# Patient Record
Sex: FEMALE | Race: WHITE | URBAN - METROPOLITAN AREA
[De-identification: names, ages, dates, MRNs, and addresses within clinical notes are randomized per-mention and may not be internally consistent; named-entity substitution may affect disease eponyms.]

---

## 2018-01-24 ENCOUNTER — APPOINTMENT (RX ONLY)
Dept: URBAN - METROPOLITAN AREA CLINIC 114 | Facility: CLINIC | Age: 63
Setting detail: DERMATOLOGY
End: 2018-01-24

## 2018-01-24 VITALS — WEIGHT: 190 LBS | HEIGHT: 70 IN

## 2018-01-24 DIAGNOSIS — L82.1 OTHER SEBORRHEIC KERATOSIS: ICD-10-CM

## 2018-01-24 DIAGNOSIS — D485 NEOPLASM OF UNCERTAIN BEHAVIOR OF SKIN: ICD-10-CM

## 2018-01-24 DIAGNOSIS — D18.0 HEMANGIOMA: ICD-10-CM

## 2018-01-24 DIAGNOSIS — L81.4 OTHER MELANIN HYPERPIGMENTATION: ICD-10-CM

## 2018-01-24 PROBLEM — D18.01 HEMANGIOMA OF SKIN AND SUBCUTANEOUS TISSUE: Status: ACTIVE | Noted: 2018-01-24

## 2018-01-24 PROBLEM — D48.5 NEOPLASM OF UNCERTAIN BEHAVIOR OF SKIN: Status: ACTIVE | Noted: 2018-01-24

## 2018-01-24 PROCEDURE — 99203 OFFICE O/P NEW LOW 30 MIN: CPT | Mod: 25

## 2018-01-24 PROCEDURE — 11100: CPT

## 2018-01-24 PROCEDURE — ? COUNSELING

## 2018-01-24 PROCEDURE — ? LIQUID NITROGEN

## 2018-01-24 PROCEDURE — ? BIOPSY BY SHAVE METHOD

## 2018-01-24 PROCEDURE — 11101: CPT

## 2018-01-24 ASSESSMENT — LOCATION ZONE DERM
LOCATION ZONE: ARM
LOCATION ZONE: LEG
LOCATION ZONE: FACE
LOCATION ZONE: TRUNK
LOCATION ZONE: NECK

## 2018-01-24 ASSESSMENT — LOCATION DETAILED DESCRIPTION DERM
LOCATION DETAILED: LEFT LATERAL SUPERIOR CHEST
LOCATION DETAILED: RIGHT ANTERIOR DISTAL UPPER ARM
LOCATION DETAILED: LEFT INFERIOR LATERAL MIDBACK
LOCATION DETAILED: EPIGASTRIC SKIN
LOCATION DETAILED: RIGHT MEDIAL MALAR CHEEK
LOCATION DETAILED: RIGHT ANTERIOR DISTAL THIGH
LOCATION DETAILED: LEFT ANTERIOR DISTAL UPPER ARM
LOCATION DETAILED: LEFT ANTERIOR DISTAL THIGH
LOCATION DETAILED: LEFT PROXIMAL RADIAL DORSAL FOREARM
LOCATION DETAILED: LEFT LATERAL TRAPEZIAL NECK
LOCATION DETAILED: LEFT CENTRAL MALAR CHEEK
LOCATION DETAILED: RIGHT MEDIAL SUPERIOR CHEST
LOCATION DETAILED: RIGHT PROXIMAL RADIAL DORSAL FOREARM
LOCATION DETAILED: LEFT INFERIOR CENTRAL MALAR CHEEK
LOCATION DETAILED: RIGHT SUPERIOR LATERAL LOWER BACK
LOCATION DETAILED: RIGHT RIB CAGE
LOCATION DETAILED: RIGHT LATERAL TRAPEZIAL NECK

## 2018-01-24 ASSESSMENT — LOCATION SIMPLE DESCRIPTION DERM
LOCATION SIMPLE: LEFT UPPER ARM
LOCATION SIMPLE: LEFT CHEEK
LOCATION SIMPLE: LEFT FOREARM
LOCATION SIMPLE: RIGHT LOWER BACK
LOCATION SIMPLE: RIGHT THIGH
LOCATION SIMPLE: RIGHT UPPER ARM
LOCATION SIMPLE: ABDOMEN
LOCATION SIMPLE: LEFT THIGH
LOCATION SIMPLE: RIGHT FOREARM
LOCATION SIMPLE: LEFT LOWER BACK
LOCATION SIMPLE: POSTERIOR NECK
LOCATION SIMPLE: RIGHT CHEEK
LOCATION SIMPLE: CHEST

## 2018-01-24 NOTE — PROCEDURE: LIQUID NITROGEN
Include Z78.9 (Other Specified Conditions Influencing Health Status) As An Associated Diagnosis?: No
Detail Level: Zone
Medical Necessity Clause: This procedure was medically necessary because the lesions that were treated were:
Post-Care Instructions: I reviewed with the patient in detail post-care instructions. Patient is to wear sunprotection, and avoid picking at any of the treated lesions. Pt may apply Vaseline to crusted or scabbing areas.
Medical Necessity Information: It is in your best interest to select a reason for this procedure from the list below. All of these items fulfill various CMS LCD requirements except the new and changing color options.
Consent: The patient's consent was obtained including but not limited to risks of crusting, scabbing, blistering, scarring, darker or lighter pigmentary change, recurrence, incomplete removal and infection.
Total Number Of Lesions Treated: 5

## 2018-01-24 NOTE — PROCEDURE: BIOPSY BY SHAVE METHOD
Additional Anesthesia Volume In Cc (Will Not Render If 0): 0
Bill 48887 For Specimen Handling/Conveyance To Laboratory?: no
Anesthesia Type: 1% lidocaine with epinephrine and a 1:10 solution of 8.4% sodium bicarbonate
Biopsy Method: Dermablade
Cryotherapy Text: The wound bed was treated with cryotherapy after the biopsy was performed.
Detail Level: Detailed
Wound Care: Polysporin ointment
Post-Care Instructions: I reviewed with the patient in detail post-care instructions: If your area was covered with a Band-Aid, remove with your next bath and gently clean daily with soap and water. Do not use hydrogen peroxide. Apply a thin layer of Aquaphor/Vaseline and cover with a Band-Aid. The goal is to keep the wound moist to prevent a scab. It is normal to have a little redness 1/8 inch around the area; however, if increased redness, pain, swelling or discolored drainage occurs, call the office. If bleeding occurs, hold steady pressure for 10-15 minutes to stop it. Our office normally receives your results in 7-10 business days and will call or email you when we receive them. If you do not hear from us in two weeks, please call us.
Biopsy Type: H and E
Electrodesiccation And Curettage Text: The wound bed was treated with electrodesiccation and curettage after the biopsy was performed.
Electrodesiccation Text: The wound bed was treated with electrodesiccation after the biopsy was performed.
Size Of Lesion In Cm: 0.4
Type Of Destruction Used: Curettage
Size Of Lesion In Cm: 0.6
Curettage Text: The wound bed was treated with curettage after the biopsy was performed.
Billing Type: Third-Party Bill
Notification Instructions: Patient will be notified of biopsy results. However, patient instructed to call the office if not contacted within 2 weeks.
Hemostasis: Drysol
Dressing: bandage
Anesthesia Volume In Cc: 0.5
Consent: Written consent was obtained and risks were reviewed including but not limited to scarring, infection, bleeding, scabbing, incomplete removal, nerve damage and allergy to anesthesia.
Silver Nitrate Text: The wound bed was treated with silver nitrate after the biopsy was performed.
Render Post-Care Instructions In Note?: yes

## 2018-02-21 ENCOUNTER — APPOINTMENT (RX ONLY)
Dept: URBAN - METROPOLITAN AREA CLINIC 114 | Facility: CLINIC | Age: 63
Setting detail: DERMATOLOGY
End: 2018-02-21

## 2018-02-21 PROBLEM — C44.519 BASAL CELL CARCINOMA OF SKIN OF OTHER PART OF TRUNK: Status: ACTIVE | Noted: 2018-02-21

## 2018-02-21 PROCEDURE — ? SUPERFICIAL RADIATION TREATMENT

## 2018-02-21 PROCEDURE — 77401: CPT

## 2018-02-21 PROCEDURE — 77332 RADIATION TREATMENT AID(S): CPT

## 2018-02-21 PROCEDURE — 77280 THER RAD SIMULAJ FIELD SMPL: CPT

## 2018-02-21 PROCEDURE — 77300 RADIATION THERAPY DOSE PLAN: CPT

## 2018-02-21 NOTE — PROCEDURE: SUPERFICIAL RADIATION TREATMENT
Long For Simulation Without Treatment Device Design (Simple Simulation): Yes
Port Dimensions-X Axis In Cm: 0.8
Render Text From Evaluation And Management Tab (Will Not Bill 05808): No
Energy (Include Units): 70
Additional Prescription Justification Text: If there is any interruption in treatment exceeding 5 days please see Decay and Dose Adjustment Calculation and complete treatment under Prescription 2, 3 or 4 as appropriate.
Total Number Of Fractions: 16
Ssd In Cm (Optional): 15
Fractions / Week Rx 3: 5
Dose / Tx In Cgy (Optional): 320.5
Body Location Override (Optional): right medical superior chest
Total Dose (Optional-Please Include Units) Rx 4: 5126.0 cGy
Depth (Optional-Please Include Units): 1.05 cm
Energy (Optional-Please Include Units): 70 kv
Time Dose Fractionation (Optional- Include Units If Applicable): 100 TDF
Field Size (Applicator): 2.0 cm
Treatment Time / Fractionation (Optional- Include Units): .49 min
Patient Positioning: Supine
Treatment Margins In Cm: 0
Treatment Time In Min (Optional): .49
Dimensions-X Axis In Cm: 0.7
Intro Statement (Will Not Render If Left Blank): The patient is undergoing superficial radiation therapy for skin cancer and presents for weekly evaluation and management.  Per protocol and as documented on the flow sheet, the patient was questioned as to subjective redness, pruritus, pain, drainage, fatigue, or any other symptoms.  Objectively, the radiation area was evaluated with regards to erythema, atrophy, scale, crusting, erosion, ulceration, edema, purpura, tenderness, warmth, drainage, and any other findings.  The plan was extensively reviewed including the dose, and dosing schedule.  The simulation and clinical setup was also reviewed as was the external and any internal shields and based on this review the appropriateness and sufficiency of treatment was determined.
Number Of Days Off Treatment: 1
Assessment: Appropriate reaction
Simple Simulation Afterword Text Will Be Included With Simple Simulations (Indications............): The patient had a complete consultation regarding all applicable modalities for the treatment of their skin cancer and based on a variety of factors including the type of tumor, size, and location, the relevant medical history as well as local tissue factors, the functional status of the individual, the ability to perform necessary postoperative wound instructions and the need for simultaneous treatments as well as overall wound healing status, it was determined that the patient would begin radiation therapy treatment for skin cancer.  A full simulation and treatment device design was performed including the determination and formulation of appropriate simple and complex devices including lead shield of 0.762 mm thickness to form molded customized shielding to specifically correlate with the lesion size including treatment margin.  The custom lead shield is adequate to accommodate the appropriate applicator and provide adequate shielding around the treatment site.  The specific field applicator, shields, and devices both simple and complex as well as the specific patient setup is outlined below.  The patient was given a full consent for superficial radiation to both verbally and in writing and the full determination of patient's eligibility for treatment and selection is outlined on the patient eligibility and treatment selection form.  The specific superficial radiotherapy prescription was determined and was documented on the superficial radiotherapy prescription form.  A treatment calculation was also performed and documented on the treatment calculation form.  Based on the prescription, the patient was scheduled for a series of fractional treatments.
Daily Fractionated Dose (Optional- Include Units): 320.5 cgy
Fractions / Week: 3
Total Dose (Optional-Please Include Units): 5128 cgy
Simple Simulation Preamble Text Will Be Included With Simple Simulations (.......... Indications): Simple simulation was performed today for the following reasons:
Day Of The Week Treatment Administered: Wednesday
Detail Level: Detailed
Functional Status: 0 (fully active)
Custom Shielding Preamble Text Will Not Be Included With Simple Simulations (.......... X X Y Cm): A lead shield of 0.762 mm thickness is utilized to form a molded, custom shield with a
Port Dimensions-Y Axis In Cm: 0.5
Custom Shielding Afterword Text Will Not Be Included With Simple Simulations (X X Y Cm............): port to correlate with the lesion size, including treatment margin. The custom lead shield is adequate to accommodate the appropriate applicator and provide adequate shielding around the treatment site. Additional shielding (as noted below) is used to protect sensitive, normal tissues.
Body Location Override (Optional): left lateral superior chest
Dimensions-Y Axis In Cm: 0.1
Treatment Device Design After Initial Simulation Justification (Will Render If Bill For Treatment Devices = Yes): The patient is status post radiation simulation and is evaluated as to the use of additional devices for shielding and placement for radiation therapy.
Field Number: 2

## 2018-02-23 ENCOUNTER — APPOINTMENT (RX ONLY)
Dept: URBAN - METROPOLITAN AREA CLINIC 114 | Facility: CLINIC | Age: 63
Setting detail: DERMATOLOGY
End: 2018-02-23

## 2018-02-23 PROBLEM — C44.519 BASAL CELL CARCINOMA OF SKIN OF OTHER PART OF TRUNK: Status: ACTIVE | Noted: 2018-02-23

## 2018-02-23 PROCEDURE — ? SUPERFICIAL RADIATION TREATMENT

## 2018-02-23 PROCEDURE — 77401: CPT

## 2018-02-23 NOTE — PROCEDURE: SUPERFICIAL RADIATION TREATMENT
Depth (Optional-Please Include Units): 1.05 cm
Port Dimensions-X Axis In Cm: 0.8
Body Location Override (Optional): right medical superior chest
Total Number Of Fractions Rx 4: 15
Bill For Radiation Treatment: No
Treatment Device Design After Initial Simulation Justification (Will Render If Bill For Treatment Devices = Yes): The patient is status post radiation simulation and is evaluated as to the use of additional devices for shielding and placement for radiation therapy.
Treatment Time / Fractionation (Optional- Include Units): .49 min
Simple Simulation Preamble Text Will Be Included With Simple Simulations (.......... Indications): Simple simulation was performed today for the following reasons:
Fractions / Week Rx 4: 5
Port Dimensions-Y Axis In Cm: 0.7
Energy (Optional-Please Include Units): 70 kv
Functional Status: 0 (fully active)
Energy (Include Units): 70
Patient Positioning: Supine
Dose Per Fractionation In Cgy (Optional): 320.5
Field Size (Applicator): 2.0 cm
Daily Fractionated Dose (Optional- Include Units): 320.5 cgy
Time Dose Fractionation (Optional- Include Units If Applicable): 100 TDF
Fractions / Week: 3
Detail Level: Detailed
Number Of Treatment Days: 1
Intro Statement (Will Not Render If Left Blank): The patient is undergoing superficial radiation therapy for skin cancer and presents for weekly evaluation and management.  Per protocol and as documented on the flow sheet, the patient was questioned as to subjective redness, pruritus, pain, drainage, fatigue, or any other symptoms.  Objectively, the radiation area was evaluated with regards to erythema, atrophy, scale, crusting, erosion, ulceration, edema, purpura, tenderness, warmth, drainage, and any other findings.  The plan was extensively reviewed including the dose, and dosing schedule.  The simulation and clinical setup was also reviewed as was the external and any internal shields and based on this review the appropriateness and sufficiency of treatment was determined.
Initial Radiation Treatment Planning (Will Render If Bill Simulation = Yes): The patient had a complete consultation regarding all applicable modalities for the treatment of their skin cancer and based on a variety of factors including the type of tumor, size, and location, the relevant medical history as well as local tissue factors, the functional status of the individual, the ability to perform necessary postoperative wound instructions and the need for simultaneous treatments as well as overall wound healing status, it was determined that the patient would begin radiation therapy treatment for skin cancer.  A full simulation and treatment device design was performed including the determination and formulation of appropriate simple and complex devices including lead shield of 0.762 mm thickness to form molded customized shielding to specifically correlate with the lesion size including treatment margin.  The custom lead shield is adequate to accommodate the appropriate applicator and provide adequate shielding around the treatment site.  The specific field applicator, shields, and devices both simple and complex as well as the specific patient setup is outlined below.  The patient was given a full consent for superficial radiation to both verbally and in writing and the full determination of patient's eligibility for treatment and selection is outlined on the patient eligibility and treatment selection form.  The specific superficial radiotherapy prescription was determined and was documented on the superficial radiotherapy prescription form.  A treatment calculation was also performed and documented on the treatment calculation form.  Based on the prescription, the patient was scheduled for a series of fractional treatments.
Treatment Time In Min (Optional): .49
Field Number: 2
Total Number Of Fractions: 16
Assessment: Appropriate reaction
Total Dose (Optional-Please Include Units): 5128 cgy
Day Of The Week Treatment Administered: Friday
Cumulative Dose In Cgy (Optional): 641
Additional Prescription Justification Text: If there is any interruption in treatment exceeding 5 days please see Decay and Dose Adjustment Calculation and complete treatment under Prescription 2, 3 or 4 as appropriate.
Custom Shielding Preamble Text Will Not Be Included With Simple Simulations (.......... X X Y Cm): A lead shield of 0.762 mm thickness is utilized to form a molded, custom shield with a
Custom Shielding Afterword Text Will Not Be Included With Simple Simulations (X X Y Cm............): port to correlate with the lesion size, including treatment margin. The custom lead shield is adequate to accommodate the appropriate applicator and provide adequate shielding around the treatment site. Additional shielding (as noted below) is used to protect sensitive, normal tissues.
Total Dose (Optional-Please Include Units) Rx 4: 5126.0 cGy
Dimensions-Y Axis In Cm: 0.1
Body Location Override (Optional): left lateral superior chest
Treatment Margins In Cm: 0
Port Dimensions-Y Axis In Cm: 0.5
Bill For Radiation Treatment: Yes

## 2018-02-26 ENCOUNTER — APPOINTMENT (RX ONLY)
Dept: URBAN - METROPOLITAN AREA CLINIC 114 | Facility: CLINIC | Age: 63
Setting detail: DERMATOLOGY
End: 2018-02-26

## 2018-02-26 PROBLEM — C44.519 BASAL CELL CARCINOMA OF SKIN OF OTHER PART OF TRUNK: Status: ACTIVE | Noted: 2018-02-26

## 2018-02-26 PROCEDURE — 77401: CPT

## 2018-02-26 PROCEDURE — ? SUPERFICIAL RADIATION TREATMENT

## 2018-02-26 NOTE — PROCEDURE: SUPERFICIAL RADIATION TREATMENT
Bill For Dosimetry/Render Treatment Time Calculation In Note: No
Total Dose (Optional-Please Include Units): 5128 cgy
Body Location Override (Optional): right medical superior chest
Cumulative Dose In Cgy (Optional): 964.8
Port Dimensions-X Axis In Cm: 0.8
Day Of The Week Treatment Administered: Monday
Total Number Of Fractions: 16
Fractionation Number (Evaluation): 5
Number Of Treatment Days: 1
Dose / Tx In Cgy (Optional): 320.5
Initial Radiation Treatment Planning (Will Render If Bill Simulation = Yes): The patient had a complete consultation regarding all applicable modalities for the treatment of their skin cancer and based on a variety of factors including the type of tumor, size, and location, the relevant medical history as well as local tissue factors, the functional status of the individual, the ability to perform necessary postoperative wound instructions and the need for simultaneous treatments as well as overall wound healing status, it was determined that the patient would begin radiation therapy treatment for skin cancer.  A full simulation and treatment device design was performed including the determination and formulation of appropriate simple and complex devices including lead shield of 0.762 mm thickness to form molded customized shielding to specifically correlate with the lesion size including treatment margin.  The custom lead shield is adequate to accommodate the appropriate applicator and provide adequate shielding around the treatment site.  The specific field applicator, shields, and devices both simple and complex as well as the specific patient setup is outlined below.  The patient was given a full consent for superficial radiation to both verbally and in writing and the full determination of patient's eligibility for treatment and selection is outlined on the patient eligibility and treatment selection form.  The specific superficial radiotherapy prescription was determined and was documented on the superficial radiotherapy prescription form.  A treatment calculation was also performed and documented on the treatment calculation form.  Based on the prescription, the patient was scheduled for a series of fractional treatments.
Port Dimensions-Y Axis In Cm: 0.7
Energy Output In Cgy/Min (Optional): 70
Computed Treatment Time In Min (Will Render The Same As Calculated Treatment Time If Left Blank): .49
Functional Status: 0 (fully active)
Assessment: Appropriate reaction
Dimensions-Y Axis In Cm: 0.1
Patient Positioning: Supine
Field Size (Applicator): 2.0 cm
Intro Statement (Will Not Render If Left Blank): The patient is undergoing superficial radiation therapy for skin cancer and presents for weekly evaluation and management.  Per protocol and as documented on the flow sheet, the patient was questioned as to subjective redness, pruritus, pain, drainage, fatigue, or any other symptoms.  Objectively, the radiation area was evaluated with regards to erythema, atrophy, scale, crusting, erosion, ulceration, edema, purpura, tenderness, warmth, drainage, and any other findings.  The plan was extensively reviewed including the dose, and dosing schedule.  The simulation and clinical setup was also reviewed as was the external and any internal shields and based on this review the appropriateness and sufficiency of treatment was determined.
Custom Shielding Preamble Text Will Not Be Included With Simple Simulations (.......... X X Y Cm): A lead shield of 0.762 mm thickness is utilized to form a molded, custom shield with a
Body Location Override (Optional): left lateral superior chest
Total Number Of Fractions Rx 4: 15
Fractionation Number: 3
Energy (Optional-Please Include Units): 70 kv
Total Dose (Optional-Please Include Units) Rx 4: 5126.0 cGy
Custom Shielding Afterword Text Will Not Be Included With Simple Simulations (X X Y Cm............): port to correlate with the lesion size, including treatment margin. The custom lead shield is adequate to accommodate the appropriate applicator and provide adequate shielding around the treatment site. Additional shielding (as noted below) is used to protect sensitive, normal tissues.
Treatment Margins In Cm: 0
Prescription Used: 2
Additional Prescription Justification Text: If there is any interruption in treatment exceeding 5 days please see Decay and Dose Adjustment Calculation and complete treatment under Prescription 2, 3 or 4 as appropriate.
Simple Simulation Preamble Text Will Be Included With Simple Simulations (.......... Indications): Simple simulation was performed today for the following reasons:
Depth (Optional-Please Include Units): 1.05 cm
Detail Level: Detailed
Time Dose Fractionation (Optional- Include Units If Applicable): 100 TDF
Cumulative Dose In Cgy (Optional): 961.5
Treatment Device Design After Initial Simulation Justification (Will Render If Bill For Treatment Devices = Yes): The patient is status post radiation simulation and is evaluated as to the use of additional devices for shielding and placement for radiation therapy.
Daily Fractionated Dose (Optional- Include Units): 320.5 cgy
Treatment Time / Fractionation (Optional- Include Units): .49 min
Port Dimensions-Y Axis In Cm: 0.5
Bill For Radiation Treatment: Yes

## 2018-02-28 ENCOUNTER — APPOINTMENT (RX ONLY)
Dept: URBAN - METROPOLITAN AREA CLINIC 114 | Facility: CLINIC | Age: 63
Setting detail: DERMATOLOGY
End: 2018-02-28

## 2018-02-28 DIAGNOSIS — L58.9 RADIODERMATITIS, UNSPECIFIED: ICD-10-CM

## 2018-02-28 PROBLEM — C44.519 BASAL CELL CARCINOMA OF SKIN OF OTHER PART OF TRUNK: Status: ACTIVE | Noted: 2018-02-28

## 2018-02-28 PROCEDURE — 99212 OFFICE O/P EST SF 10 MIN: CPT | Mod: 25

## 2018-02-28 PROCEDURE — 77401: CPT

## 2018-02-28 PROCEDURE — ? COUNSELING

## 2018-02-28 PROCEDURE — ? SUPERFICIAL RADIATION TREATMENT

## 2018-02-28 NOTE — PROCEDURE: SUPERFICIAL RADIATION TREATMENT
Number Of Days Off Treatment: 1
Treatment Time / Fractionation (Optional- Include Units): .49 min
Time Dose Fractionation (Optional- Include Units If Applicable): 100 TDF
Total Number Of Fractions: 16
Energy (Optional-Please Include Units): 70 kv
Dose Per Fractionation In Cgy (Optional): 320.5
Custom Shielding Afterword Text Will Not Be Included With Simple Simulations (X X Y Cm............): port to correlate with the lesion size, including treatment margin. The custom lead shield is adequate to accommodate the appropriate applicator and provide adequate shielding around the treatment site. Additional shielding (as noted below) is used to protect sensitive, normal tissues.
Render Patient Eligibility And Selection In Note?: No
Total Number Of Fractions Rx 2: 15
Body Location Override (Optional): left lateral superior chest
Treatment Margins In Cm: 0
Daily Fractionated Dose (Optional- Include Units): 320.5 cgy
Computed Treatment Time In Min (Will Render The Same As Calculated Treatment Time If Left Blank): .49
Fractionation Number (Evaluation): 4
Field Size (Applicator): 2.0 cm
Simple Simulation Preamble Text Will Be Included With Simple Simulations (.......... Indications): Simple simulation was performed today for the following reasons:
Assessment: Appropriate reaction
Fractions / Week Rx 3: 5
Intro Statement (Will Not Render If Left Blank): The patient is undergoing superficial radiation therapy for skin cancer and presents for weekly evaluation and management.  Per protocol and as documented on the flow sheet, the patient was questioned as to subjective redness, pruritus, pain, drainage, fatigue, or any other symptoms.  Objectively, the radiation area was evaluated with regards to erythema, atrophy, scale, crusting, erosion, ulceration, edema, purpura, tenderness, warmth, drainage, and any other findings.  The plan was extensively reviewed including the dose, and dosing schedule.  The simulation and clinical setup was also reviewed as was the external and any internal shields and based on this review the appropriateness and sufficiency of treatment was determined.
Functional Status: 0 (fully active)
Bill For Radiation Treatment: Yes
Dimensions-Y Axis In Cm: 0.1
Additional Prescription Justification Text: If there is any interruption in treatment exceeding 5 days please see Decay and Dose Adjustment Calculation and complete treatment under Prescription 2, 3 or 4 as appropriate.
Fractions / Week: 3
Energy (Include Units): 70
Cumulative Dose In Cgy (Optional): 1282
Simple Simulation Afterword Text Will Be Included With Simple Simulations (Indications............): The patient had a complete consultation regarding all applicable modalities for the treatment of their skin cancer and based on a variety of factors including the type of tumor, size, and location, the relevant medical history as well as local tissue factors, the functional status of the individual, the ability to perform necessary postoperative wound instructions and the need for simultaneous treatments as well as overall wound healing status, it was determined that the patient would begin radiation therapy treatment for skin cancer.  A full simulation and treatment device design was performed including the determination and formulation of appropriate simple and complex devices including lead shield of 0.762 mm thickness to form molded customized shielding to specifically correlate with the lesion size including treatment margin.  The custom lead shield is adequate to accommodate the appropriate applicator and provide adequate shielding around the treatment site.  The specific field applicator, shields, and devices both simple and complex as well as the specific patient setup is outlined below.  The patient was given a full consent for superficial radiation to both verbally and in writing and the full determination of patient's eligibility for treatment and selection is outlined on the patient eligibility and treatment selection form.  The specific superficial radiotherapy prescription was determined and was documented on the superficial radiotherapy prescription form.  A treatment calculation was also performed and documented on the treatment calculation form.  Based on the prescription, the patient was scheduled for a series of fractional treatments.
Patient Positioning: Supine
Depth (Optional-Please Include Units): 1.05 cm
Total Dose (Optional-Please Include Units) Rx 4: 5126.0 cGy
Port Dimensions-Y Axis In Cm: 0.7
Field Number: 2
Treatment Device Design After Initial Simulation Justification (Will Render If Bill For Treatment Devices = Yes): The patient is status post radiation simulation and is evaluated as to the use of additional devices for shielding and placement for radiation therapy.
Day Of The Week Treatment Administered: Wednesday
Port Dimensions-X Axis In Cm: 0.8
Total Dose (Optional-Please Include Units): 5128 cgy
Custom Shielding Preamble Text Will Not Be Included With Simple Simulations (.......... X X Y Cm): A lead shield of 0.762 mm thickness is utilized to form a molded, custom shield with a
Detail Level: Detailed
Body Location Override (Optional): right medical superior chest
Port Dimensions-Y Axis In Cm: 0.5

## 2018-03-02 ENCOUNTER — APPOINTMENT (RX ONLY)
Dept: URBAN - METROPOLITAN AREA CLINIC 114 | Facility: CLINIC | Age: 63
Setting detail: DERMATOLOGY
End: 2018-03-02

## 2018-03-02 DIAGNOSIS — L58.9 RADIODERMATITIS, UNSPECIFIED: ICD-10-CM

## 2018-03-02 PROBLEM — C44.519 BASAL CELL CARCINOMA OF SKIN OF OTHER PART OF TRUNK: Status: ACTIVE | Noted: 2018-03-02

## 2018-03-02 PROCEDURE — 99213 OFFICE O/P EST LOW 20 MIN: CPT | Mod: 25

## 2018-03-02 PROCEDURE — ? SUPERFICIAL RADIATION TREATMENT

## 2018-03-02 PROCEDURE — 77401: CPT

## 2018-03-02 PROCEDURE — 77427 RADIATION TX MANAGEMENT X5: CPT

## 2018-03-02 PROCEDURE — ? COUNSELING

## 2018-03-02 ASSESSMENT — PAIN INTENSITY VAS: HOW INTENSE IS YOUR PAIN 0 BEING NO PAIN, 10 BEING THE MOST SEVERE PAIN POSSIBLE?: NO PAIN

## 2018-03-02 ASSESSMENT — BSA RASH: BSA RASH: 1

## 2018-03-02 ASSESSMENT — SEVERITY ASSESSMENT: SEVERITY: MILD

## 2018-03-02 NOTE — PROCEDURE: SUPERFICIAL RADIATION TREATMENT
Port Dimensions-X Axis In Cm: 0.8
Port Dimensions-Y Axis In Cm: 0.5
Treatment Time / Fractionation (Optional- Include Units): .49 min
Additional Prescription Justification Text: If there is any interruption in treatment exceeding 5 days please see Decay and Dose Adjustment Calculation and complete treatment under Prescription 2, 3 or 4 as appropriate.
Energy (Include Units): 70
Functional Status: 0 (fully active)
Field Size (Applicator): 2.0 cm
Dose / Tx In Cgy (Optional): 320.5
Include Rx 4 When Rendering Additional Prescriptions: No
Cumulative Dose In Cgy (Optional): 1602.5
Energy (Optional-Please Include Units): 70 kv
Fractionation Number (Evaluation): 5
Bill For Radiation Treatment: Yes
Detail Level: Detailed
Body Location Override (Optional): right medical superior chest
Ssd In Cm (Optional): 15
Body Location Override (Optional): left lateral superior chest
Prescription Used: 1
Simple Simulation Afterword Text Will Be Included With Simple Simulations (Indications............): The patient had a complete consultation regarding all applicable modalities for the treatment of their skin cancer and based on a variety of factors including the type of tumor, size, and location, the relevant medical history as well as local tissue factors, the functional status of the individual, the ability to perform necessary postoperative wound instructions and the need for simultaneous treatments as well as overall wound healing status, it was determined that the patient would begin radiation therapy treatment for skin cancer.  A full simulation and treatment device design was performed including the determination and formulation of appropriate simple and complex devices including lead shield of 0.762 mm thickness to form molded customized shielding to specifically correlate with the lesion size including treatment margin.  The custom lead shield is adequate to accommodate the appropriate applicator and provide adequate shielding around the treatment site.  The specific field applicator, shields, and devices both simple and complex as well as the specific patient setup is outlined below.  The patient was given a full consent for superficial radiation to both verbally and in writing and the full determination of patient's eligibility for treatment and selection is outlined on the patient eligibility and treatment selection form.  The specific superficial radiotherapy prescription was determined and was documented on the superficial radiotherapy prescription form.  A treatment calculation was also performed and documented on the treatment calculation form.  Based on the prescription, the patient was scheduled for a series of fractional treatments.
Total Number Of Fractions: 16
Daily Fractionated Dose (Optional- Include Units): 320.5 cgy
Total Dose (Optional-Please Include Units) Rx 4: 5126.0 cGy
Simple Simulation Preamble Text Will Be Included With Simple Simulations (.......... Indications): Simple simulation was performed today for the following reasons:
Dimensions-Y Axis In Cm: 0.1
Computed Treatment Time In Min (Will Render The Same As Calculated Treatment Time If Left Blank): .49
Time Dose Fractionation (Optional- Include Units If Applicable): 100 TDF
Dimensions-X Axis In Cm: 0.7
Fractions / Week: 3
Total Dose (Optional-Please Include Units): 5128 cgy
Patient Positioning: Supine
Other Assessment (Will Overide Current Assessment): ANNETTE=1
Custom Shielding Afterword Text Will Not Be Included With Simple Simulations (X X Y Cm............): port to correlate with the lesion size, including treatment margin. The custom lead shield is adequate to accommodate the appropriate applicator and provide adequate shielding around the treatment site. Additional shielding (as noted below) is used to protect sensitive, normal tissues.
Treatment Device Design After Initial Simulation Justification (Will Render If Bill For Treatment Devices = Yes): The patient is status post radiation simulation and is evaluated as to the use of additional devices for shielding and placement for radiation therapy.
Custom Shielding Preamble Text Will Not Be Included With Simple Simulations (.......... X X Y Cm): A lead shield of 0.762 mm thickness is utilized to form a molded, custom shield with a
Day Of The Week Treatment Administered: Friday
Depth (Optional-Please Include Units): 1.05 cm
Assessment: Appropriate reaction
Field Number: 2
Intro Statement (Will Not Render If Left Blank): The patient is undergoing superficial radiation therapy for skin cancer and presents for weekly evaluation and management.  Per protocol and as documented on the flow sheet, the patient was questioned as to subjective redness, pruritus, pain, drainage, fatigue, or any other symptoms.  Objectively, the radiation area was evaluated with regards to erythema, atrophy, scale, crusting, erosion, ulceration, edema, purpura, tenderness, warmth, drainage, and any other findings.  The plan was extensively reviewed including the dose, and dosing schedule.  The simulation and clinical setup was also reviewed as was the external and any internal shields and based on this review the appropriateness and sufficiency of treatment was determined.

## 2018-03-05 ENCOUNTER — APPOINTMENT (RX ONLY)
Dept: URBAN - METROPOLITAN AREA CLINIC 114 | Facility: CLINIC | Age: 63
Setting detail: DERMATOLOGY
End: 2018-03-05

## 2018-03-05 DIAGNOSIS — L58.9 RADIODERMATITIS, UNSPECIFIED: ICD-10-CM

## 2018-03-05 PROBLEM — C44.519 BASAL CELL CARCINOMA OF SKIN OF OTHER PART OF TRUNK: Status: ACTIVE | Noted: 2018-03-05

## 2018-03-05 PROCEDURE — ? COUNSELING

## 2018-03-05 PROCEDURE — 77401: CPT

## 2018-03-05 PROCEDURE — ? SUPERFICIAL RADIATION TREATMENT

## 2018-03-05 NOTE — PROCEDURE: SUPERFICIAL RADIATION TREATMENT
Include Rx 2 When Rendering Additional Prescriptions: No
Computed Treatment Time In Min (Will Render The Same As Calculated Treatment Time If Left Blank): .49
Fractions / Week Rx 4: 5
Total Dose (Optional-Please Include Units): 5128 cgy
Total Number Of Fractions Rx 3: 15
Depth (Optional-Please Include Units): 1.05 cm
Bill For Radiation Treatment: Yes
Initial Radiation Treatment Planning (Will Render If Bill Simulation = Yes): The patient had a complete consultation regarding all applicable modalities for the treatment of their skin cancer and based on a variety of factors including the type of tumor, size, and location, the relevant medical history as well as local tissue factors, the functional status of the individual, the ability to perform necessary postoperative wound instructions and the need for simultaneous treatments as well as overall wound healing status, it was determined that the patient would begin radiation therapy treatment for skin cancer.  A full simulation and treatment device design was performed including the determination and formulation of appropriate simple and complex devices including lead shield of 0.762 mm thickness to form molded customized shielding to specifically correlate with the lesion size including treatment margin.  The custom lead shield is adequate to accommodate the appropriate applicator and provide adequate shielding around the treatment site.  The specific field applicator, shields, and devices both simple and complex as well as the specific patient setup is outlined below.  The patient was given a full consent for superficial radiation to both verbally and in writing and the full determination of patient's eligibility for treatment and selection is outlined on the patient eligibility and treatment selection form.  The specific superficial radiotherapy prescription was determined and was documented on the superficial radiotherapy prescription form.  A treatment calculation was also performed and documented on the treatment calculation form.  Based on the prescription, the patient was scheduled for a series of fractional treatments.
Simple Simulation Preamble Text Will Be Included With Simple Simulations (.......... Indications): Simple simulation was performed today for the following reasons:
Other Assessment (Will Overide Current Assessment): ANNETTE=1
Body Location Override (Optional): right medical superior chest
Field Size (Applicator): 2.0 cm
Prescription Used: 1
Dimensions-X Axis In Cm: 0.7
Treatment Margins In Cm: 0.5
Total Dose (Optional-Please Include Units) Rx 4: 5126.0 cGy
Dimensions-Y Axis In Cm: 0.1
Prescription Used: 2
Energy (Optional-Please Include Units): 70 kv
Time Dose Fractionation (Optional- Include Units If Applicable): 100 TDF
Day Of The Week Treatment Administered: Monday
Custom Shielding Preamble Text Will Not Be Included With Simple Simulations (.......... X X Y Cm): A lead shield of 0.762 mm thickness is utilized to form a molded, custom shield with a
Functional Status: 0 (fully active)
Detail Level: Detailed
Patient Positioning: Supine
Daily Fractionated Dose (Optional- Include Units): 320.5 cgy
Intro Statement (Will Not Render If Left Blank): The patient is undergoing superficial radiation therapy for skin cancer and presents for weekly evaluation and management.  Per protocol and as documented on the flow sheet, the patient was questioned as to subjective redness, pruritus, pain, drainage, fatigue, or any other symptoms.  Objectively, the radiation area was evaluated with regards to erythema, atrophy, scale, crusting, erosion, ulceration, edema, purpura, tenderness, warmth, drainage, and any other findings.  The plan was extensively reviewed including the dose, and dosing schedule.  The simulation and clinical setup was also reviewed as was the external and any internal shields and based on this review the appropriateness and sufficiency of treatment was determined.
Dose / Tx In Cgy (Optional): 320.5
Energy Output In Cgy/Min (Optional): 70
Port Dimensions-X Axis In Cm: 0.8
Cumulative Dose In Cgy (Optional): 1923.0
Fractionation Number: 6
Custom Shielding Afterword Text Will Not Be Included With Simple Simulations (X X Y Cm............): port to correlate with the lesion size, including treatment margin. The custom lead shield is adequate to accommodate the appropriate applicator and provide adequate shielding around the treatment site. Additional shielding (as noted below) is used to protect sensitive, normal tissues.
Body Location Override (Optional): left lateral superior chest
Treatment Device Design After Initial Simulation Justification (Will Render If Bill For Treatment Devices = Yes): The patient is status post radiation simulation and is evaluated as to the use of additional devices for shielding and placement for radiation therapy.
Fractions / Week: 3
Treatment Time / Fractionation (Optional- Include Units): .49 min
Total Number Of Fractions: 16
Assessment: Appropriate reaction
Additional Prescription Justification Text: If there is any interruption in treatment exceeding 5 days please see Decay and Dose Adjustment Calculation and complete treatment under Prescription 2, 3 or 4 as appropriate.

## 2018-03-07 ENCOUNTER — APPOINTMENT (RX ONLY)
Dept: URBAN - METROPOLITAN AREA CLINIC 114 | Facility: CLINIC | Age: 63
Setting detail: DERMATOLOGY
End: 2018-03-07

## 2018-03-07 DIAGNOSIS — L58.9 RADIODERMATITIS, UNSPECIFIED: ICD-10-CM

## 2018-03-07 PROBLEM — C44.519 BASAL CELL CARCINOMA OF SKIN OF OTHER PART OF TRUNK: Status: ACTIVE | Noted: 2018-03-07

## 2018-03-07 PROCEDURE — 99213 OFFICE O/P EST LOW 20 MIN: CPT | Mod: 25

## 2018-03-07 PROCEDURE — ? COUNSELING

## 2018-03-07 PROCEDURE — ? SUPERFICIAL RADIATION TREATMENT

## 2018-03-07 PROCEDURE — 77401: CPT

## 2018-03-07 ASSESSMENT — PAIN INTENSITY VAS: HOW INTENSE IS YOUR PAIN 0 BEING NO PAIN, 10 BEING THE MOST SEVERE PAIN POSSIBLE?: NO PAIN

## 2018-03-07 ASSESSMENT — SEVERITY ASSESSMENT: SEVERITY: MILD

## 2018-03-07 NOTE — PROCEDURE: SUPERFICIAL RADIATION TREATMENT
Field Size (Applicator): 2.0 cm
Treatment Margins In Cm: 0.5
Total Number Of Fractions Rx 2: 15
Render Additional Prescriptions In Note?: No
Initial Radiation Treatment Planning (Will Render If Bill Simulation = Yes): The patient had a complete consultation regarding all applicable modalities for the treatment of their skin cancer and based on a variety of factors including the type of tumor, size, and location, the relevant medical history as well as local tissue factors, the functional status of the individual, the ability to perform necessary postoperative wound instructions and the need for simultaneous treatments as well as overall wound healing status, it was determined that the patient would begin radiation therapy treatment for skin cancer.  A full simulation and treatment device design was performed including the determination and formulation of appropriate simple and complex devices including lead shield of 0.762 mm thickness to form molded customized shielding to specifically correlate with the lesion size including treatment margin.  The custom lead shield is adequate to accommodate the appropriate applicator and provide adequate shielding around the treatment site.  The specific field applicator, shields, and devices both simple and complex as well as the specific patient setup is outlined below.  The patient was given a full consent for superficial radiation to both verbally and in writing and the full determination of patient's eligibility for treatment and selection is outlined on the patient eligibility and treatment selection form.  The specific superficial radiotherapy prescription was determined and was documented on the superficial radiotherapy prescription form.  A treatment calculation was also performed and documented on the treatment calculation form.  Based on the prescription, the patient was scheduled for a series of fractional treatments.
Computed Treatment Time In Min (Will Render The Same As Calculated Treatment Time If Left Blank): .49
Custom Shielding Preamble Text Will Not Be Included With Simple Simulations (.......... X X Y Cm): A lead shield of 0.762 mm thickness is utilized to form a molded, custom shield with a
Fractions / Week Rx 4: 5
Body Location Override (Optional): right medial superior chest
Intro Statement (Will Not Render If Left Blank): The patient is undergoing superficial radiation therapy for skin cancer and presents for weekly evaluation and management.  Per protocol and as documented on the flow sheet, the patient was questioned as to subjective redness, pruritus, pain, drainage, fatigue, or any other symptoms.  Objectively, the radiation area was evaluated with regards to erythema, atrophy, scale, crusting, erosion, ulceration, edema, purpura, tenderness, warmth, drainage, and any other findings.  The plan was extensively reviewed including the dose, and dosing schedule.  The simulation and clinical setup was also reviewed as was the external and any internal shields and based on this review the appropriateness and sufficiency of treatment was determined.
Energy (Include Units): 70
Prescription Used: 1
Total Dose (Optional-Please Include Units): 5128 cgy
Port Dimensions-Y Axis In Cm: 0.7
Patient Positioning: Supine
Body Location Override (Optional): left lateral superior chest
Simple Simulation Preamble Text Will Be Included With Simple Simulations (.......... Indications): Simple simulation was performed today for the following reasons:
Dimensions-X Axis In Cm: 0.8
Depth (Optional-Please Include Units): 1.05 cm
Dose / Tx In Cgy (Optional): 320.5
Fractions / Week: 3
Bill For Radiation Treatment: Yes
Other Assessment (Will Overide Current Assessment): ANNETTE=1
Prescription Used: 2
Fractionation Number: 7
Day Of The Week Treatment Administered: Wednesday
Functional Status: 0 (fully active)
Total Number Of Fractions: 16
Daily Fractionated Dose (Optional- Include Units): 320.5 cgy
Cumulative Dose In Cgy (Optional): 1923.0
Custom Shielding Afterword Text Will Not Be Included With Simple Simulations (X X Y Cm............): port to correlate with the lesion size, including treatment margin. The custom lead shield is adequate to accommodate the appropriate applicator and provide adequate shielding around the treatment site. Additional shielding (as noted below) is used to protect sensitive, normal tissues.
Time Dose Fractionation (Optional- Include Units If Applicable): 100 TDF
Total Dose (Optional-Please Include Units) Rx 4: 5126.0 cGy
Detail Level: Detailed
Comments: patient consulted with GUERRERO re: scar treatment options from previous.xrt left posterior lower extremity/ng
Day Of The Week Treatment Administered: Monday
Additional Prescription Justification Text: If there is any interruption in treatment exceeding 5 days please see Decay and Dose Adjustment Calculation and complete treatment under Prescription 2, 3 or 4 as appropriate.
Energy (Optional-Please Include Units): 70 kv
Treatment Device Design After Initial Simulation Justification (Will Render If Bill For Treatment Devices = Yes): The patient is status post radiation simulation and is evaluated as to the use of additional devices for shielding and placement for radiation therapy.
Dimensions-Y Axis In Cm: 0.1
Assessment: Appropriate reaction
Treatment Time / Fractionation (Optional- Include Units): .49 min
Fractionation Number: 6
Cumulative Dose In Cgy (Optional): 2243.5
Body Location Override (Optional): right medical superior chest

## 2018-03-09 ENCOUNTER — APPOINTMENT (RX ONLY)
Dept: URBAN - METROPOLITAN AREA CLINIC 114 | Facility: CLINIC | Age: 63
Setting detail: DERMATOLOGY
End: 2018-03-09

## 2018-03-09 VITALS — WEIGHT: 190 LBS | HEIGHT: 70 IN

## 2018-03-09 PROBLEM — C44.519 BASAL CELL CARCINOMA OF SKIN OF OTHER PART OF TRUNK: Status: ACTIVE | Noted: 2018-03-09

## 2018-03-09 PROCEDURE — ? SUPERFICIAL RADIATION TREATMENT

## 2018-03-09 PROCEDURE — 77401: CPT

## 2018-03-09 NOTE — PROCEDURE: SUPERFICIAL RADIATION TREATMENT
Treatment Device Design After Initial Simulation Justification (Will Render If Bill For Treatment Devices = Yes): The patient is status post radiation simulation and is evaluated as to the use of additional devices for shielding and placement for radiation therapy.
Field Number: 2
Custom Shielding Preamble Text Will Not Be Included With Simple Simulations (.......... X X Y Cm): A lead shield of 0.762 mm thickness is utilized to form a molded, custom shield with a
Field Size (Applicator): 2.0 cm
Dimensions-Y Axis In Cm: 0.7
Long For Simulation Without Treatment Device Design (Simple Simulation): No
Treatment Time In Min (Optional): .49
Fractions / Week Rx 3: 5
Number Of Days Off Treatment: 1
Other Assessment (Will Overide Current Assessment): ANNETTE=1
Energy (Include Units): 70 kV
Dimensions-X Axis In Cm: 0.8
Total Number Of Fractions Rx 4: 15
Depth (Optional-Please Include Units): 1.05 cm
Treatment Margins In Cm: 0.5
Fractionation Number: 8
Patient Positioning: Supine
Day Of The Week Treatment Administered: Friday
Time Dose Fractionation (Optional- Include Units If Applicable): 100 TDF
Total Number Of Fractions: 16
Dose Per Fractionation In Cgy (Optional): 320.5
Body Location Override (Optional): left lateral superior chest
Additional Prescription Justification Text: If there is any interruption in treatment exceeding 5 days please see Decay and Dose Adjustment Calculation and complete treatment under Prescription 2, 3 or 4 as appropriate.
Bill For Radiation Treatment: Yes
Daily Fractionated Dose (Optional- Include Units): 320.5 cgy
Simple Simulation Afterword Text Will Be Included With Simple Simulations (Indications............): The patient had a complete consultation regarding all applicable modalities for the treatment of their skin cancer and based on a variety of factors including the type of tumor, size, and location, the relevant medical history as well as local tissue factors, the functional status of the individual, the ability to perform necessary postoperative wound instructions and the need for simultaneous treatments as well as overall wound healing status, it was determined that the patient would begin radiation therapy treatment for skin cancer.  A full simulation and treatment device design was performed including the determination and formulation of appropriate simple and complex devices including lead shield of 0.762 mm thickness to form molded customized shielding to specifically correlate with the lesion size including treatment margin.  The custom lead shield is adequate to accommodate the appropriate applicator and provide adequate shielding around the treatment site.  The specific field applicator, shields, and devices both simple and complex as well as the specific patient setup is outlined below.  The patient was given a full consent for superficial radiation to both verbally and in writing and the full determination of patient's eligibility for treatment and selection is outlined on the patient eligibility and treatment selection form.  The specific superficial radiotherapy prescription was determined and was documented on the superficial radiotherapy prescription form.  A treatment calculation was also performed and documented on the treatment calculation form.  Based on the prescription, the patient was scheduled for a series of fractional treatments.
Detail Level: Detailed
Comments: patient consulted with GUERRERO re: scar treatment options from previous.xrt left posterior lower extremity/ng
Functional Status: 0 (fully active)
Assessment: Appropriate reaction
Energy Output In Cgy/Min (Optional): 70
Treatment Time / Fractionation (Optional- Include Units): .49 min
Cumulative Dose In Cgy (Optional): 2564.0
Total Dose (Optional-Please Include Units) Rx 4: 5126.0 cGy
Custom Shielding Afterword Text Will Not Be Included With Simple Simulations (X X Y Cm............): port to correlate with the lesion size, including treatment margin. The custom lead shield is adequate to accommodate the appropriate applicator and provide adequate shielding around the treatment site. Additional shielding (as noted below) is used to protect sensitive, normal tissues.
Simple Simulation Preamble Text Will Be Included With Simple Simulations (.......... Indications): Simple simulation was performed today for the following reasons:
Fractions / Week: 3
Intro Statement (Will Not Render If Left Blank): The patient is undergoing superficial radiation therapy for skin cancer and presents for weekly evaluation and management.  Per protocol and as documented on the flow sheet, the patient was questioned as to subjective redness, pruritus, pain, drainage, fatigue, or any other symptoms.  Objectively, the radiation area was evaluated with regards to erythema, atrophy, scale, crusting, erosion, ulceration, edema, purpura, tenderness, warmth, drainage, and any other findings.  The plan was extensively reviewed including the dose, and dosing schedule.  The simulation and clinical setup was also reviewed as was the external and any internal shields and based on this review the appropriateness and sufficiency of treatment was determined.
Fractionation Number (Evaluation): 7
Total Dose (Optional-Please Include Units): 5128 cgy
Body Location Override (Optional): right medial superior chest

## 2018-03-12 ENCOUNTER — APPOINTMENT (RX ONLY)
Dept: URBAN - METROPOLITAN AREA CLINIC 114 | Facility: CLINIC | Age: 63
Setting detail: DERMATOLOGY
End: 2018-03-12

## 2018-03-12 VITALS — HEIGHT: 70 IN | WEIGHT: 190 LBS

## 2018-03-12 PROBLEM — C44.519 BASAL CELL CARCINOMA OF SKIN OF OTHER PART OF TRUNK: Status: ACTIVE | Noted: 2018-03-12

## 2018-03-12 PROCEDURE — 77401: CPT

## 2018-03-12 PROCEDURE — ? SUPERFICIAL RADIATION TREATMENT

## 2018-03-12 NOTE — PROCEDURE: SUPERFICIAL RADIATION TREATMENT
Dose Per Fractionation In Cgy (Optional): 320.5
Energy (Include Units): 70 kV
Number Of Treatment Days: 1
Energy Output In Cgy/Min (Optional): 70
Additional Prescription Justification Text: If there is any interruption in treatment exceeding 5 days please see Decay and Dose Adjustment Calculation and complete treatment under Prescription 2, 3 or 4 as appropriate.
Fractionation Number (Evaluation): 7
Include Rx 4 When Rendering Additional Prescriptions: No
Total Number Of Fractions Rx 3: 15
Computed Treatment Time In Min (Will Render The Same As Calculated Treatment Time If Left Blank): .49
Total Number Of Fractions: 16
Fractions / Week: 3
Time Dose Fractionation (Optional- Include Units If Applicable): 100 TDF
Field Size (Applicator): 2.0 cm
Simple Simulation Afterword Text Will Be Included With Simple Simulations (Indications............): The patient had a complete consultation regarding all applicable modalities for the treatment of their skin cancer and based on a variety of factors including the type of tumor, size, and location, the relevant medical history as well as local tissue factors, the functional status of the individual, the ability to perform necessary postoperative wound instructions and the need for simultaneous treatments as well as overall wound healing status, it was determined that the patient would begin radiation therapy treatment for skin cancer.  A full simulation and treatment device design was performed including the determination and formulation of appropriate simple and complex devices including lead shield of 0.762 mm thickness to form molded customized shielding to specifically correlate with the lesion size including treatment margin.  The custom lead shield is adequate to accommodate the appropriate applicator and provide adequate shielding around the treatment site.  The specific field applicator, shields, and devices both simple and complex as well as the specific patient setup is outlined below.  The patient was given a full consent for superficial radiation to both verbally and in writing and the full determination of patient's eligibility for treatment and selection is outlined on the patient eligibility and treatment selection form.  The specific superficial radiotherapy prescription was determined and was documented on the superficial radiotherapy prescription form.  A treatment calculation was also performed and documented on the treatment calculation form.  Based on the prescription, the patient was scheduled for a series of fractional treatments.
Bill For Radiation Treatment: Yes
Custom Shielding Preamble Text Will Not Be Included With Simple Simulations (.......... X X Y Cm): A lead shield of 0.762 mm thickness is utilized to form a molded, custom shield with a
Depth (Optional-Please Include Units): 1.05 cm
Functional Status: 0 (fully active)
Total Dose (Optional-Please Include Units) Rx 4: 5126.0 cGy
Day Of The Week Treatment Administered: Monday
Body Location Override (Optional): left lateral superior chest
Assessment: Appropriate reaction
Other Assessment (Will Overide Current Assessment): ANNETTE=1
Treatment Time / Fractionation (Optional- Include Units): .49 min
Cumulative Dose In Cgy (Optional): 2564.0
Port Dimensions-Y Axis In Cm: 0.7
Treatment Margins In Cm: 0.5
Simple Simulation Preamble Text Will Be Included With Simple Simulations (.......... Indications): Simple simulation was performed today for the following reasons:
Total Dose (Optional-Please Include Units): 5128 cgy
Patient Positioning: Supine
Daily Fractionated Dose (Optional- Include Units): 320.5 cgy
Intro Statement (Will Not Render If Left Blank): The patient is undergoing superficial radiation therapy for skin cancer and presents for weekly evaluation and management.  Per protocol and as documented on the flow sheet, the patient was questioned as to subjective redness, pruritus, pain, drainage, fatigue, or any other symptoms.  Objectively, the radiation area was evaluated with regards to erythema, atrophy, scale, crusting, erosion, ulceration, edema, purpura, tenderness, warmth, drainage, and any other findings.  The plan was extensively reviewed including the dose, and dosing schedule.  The simulation and clinical setup was also reviewed as was the external and any internal shields and based on this review the appropriateness and sufficiency of treatment was determined.
Port Dimensions-X Axis In Cm: 0.8
Custom Shielding Afterword Text Will Not Be Included With Simple Simulations (X X Y Cm............): port to correlate with the lesion size, including treatment margin. The custom lead shield is adequate to accommodate the appropriate applicator and provide adequate shielding around the treatment site. Additional shielding (as noted below) is used to protect sensitive, normal tissues.
Fractions / Week Rx 2: 5
Treatment Device Design After Initial Simulation Justification (Will Render If Bill For Treatment Devices = Yes): The patient is status post radiation simulation and is evaluated as to the use of additional devices for shielding and placement for radiation therapy.
Comments: patient consulted with GUERRERO re: scar treatment options from previous.xrt left posterior lower extremity/ng
Detail Level: Detailed
Fractionation Number: 9

## 2018-03-14 ENCOUNTER — APPOINTMENT (RX ONLY)
Dept: URBAN - METROPOLITAN AREA CLINIC 114 | Facility: CLINIC | Age: 63
Setting detail: DERMATOLOGY
End: 2018-03-14

## 2018-03-14 PROBLEM — C44.519 BASAL CELL CARCINOMA OF SKIN OF OTHER PART OF TRUNK: Status: ACTIVE | Noted: 2018-03-14

## 2018-03-14 PROCEDURE — 77401: CPT

## 2018-03-14 PROCEDURE — ? SUPERFICIAL RADIATION TREATMENT

## 2018-03-14 NOTE — PROCEDURE: SUPERFICIAL RADIATION TREATMENT
Depth (Optional-Please Include Units): 1.05 cm
Fractions / Week: 3
Time Dose Fractionation (Optional- Include Units If Applicable): 100 TDF
Field Size (Applicator): 2.0 cm
Bill For Treatment Devices Only: No
Fractions / Week Rx 4: 5
Energy Output In Cgy/Min (Optional): 651
Treatment Time In Min (Optional): .49
Treatment Time / Fractionation (Optional- Include Units): .49 min
Energy (Optional-Please Include Units): 70 kv
Total Dose (Optional-Please Include Units): 5128 cgy
Simple Simulation Preamble Text Will Be Included With Simple Simulations (.......... Indications): Simple simulation was performed today for the following reasons:
Number Of Treatment Days: 1
Comments: patient consulted with GUERRERO re: scar treatment options from previous.xrt left posterior lower extremity/ng
Port Dimensions-X Axis In Cm: 0.8
Functional Status: 0 (fully active)
Fractionation Number (Evaluation): 7
Port Dimensions-Y Axis In Cm: 0.5
Field Number: 2
Treatment Device Design After Initial Simulation Justification (Will Render If Bill For Treatment Devices = Yes): The patient is status post radiation simulation and is evaluated as to the use of additional devices for shielding and placement for radiation therapy.
Port Dimensions-Y Axis In Cm: 0.7
Energy (Include Units): 70
Day Of The Week Treatment Administered: Wednesday
Total Dose (Optional-Please Include Units) Rx 4: 5126.0 cGy
Detail Level: Detailed
Daily Fractionated Dose (Optional- Include Units): 320.5 cgy
Initial Radiation Treatment Planning (Will Render If Bill Simulation = Yes): The patient had a complete consultation regarding all applicable modalities for the treatment of their skin cancer and based on a variety of factors including the type of tumor, size, and location, the relevant medical history as well as local tissue factors, the functional status of the individual, the ability to perform necessary postoperative wound instructions and the need for simultaneous treatments as well as overall wound healing status, it was determined that the patient would begin radiation therapy treatment for skin cancer.  A full simulation and treatment device design was performed including the determination and formulation of appropriate simple and complex devices including lead shield of 0.762 mm thickness to form molded customized shielding to specifically correlate with the lesion size including treatment margin.  The custom lead shield is adequate to accommodate the appropriate applicator and provide adequate shielding around the treatment site.  The specific field applicator, shields, and devices both simple and complex as well as the specific patient setup is outlined below.  The patient was given a full consent for superficial radiation to both verbally and in writing and the full determination of patient's eligibility for treatment and selection is outlined on the patient eligibility and treatment selection form.  The specific superficial radiotherapy prescription was determined and was documented on the superficial radiotherapy prescription form.  A treatment calculation was also performed and documented on the treatment calculation form.  Based on the prescription, the patient was scheduled for a series of fractional treatments.
Total Number Of Fractions Rx 3: 15
Intro Statement (Will Not Render If Left Blank): The patient is undergoing superficial radiation therapy for skin cancer and presents for weekly evaluation and management.  Per protocol and as documented on the flow sheet, the patient was questioned as to subjective redness, pruritus, pain, drainage, fatigue, or any other symptoms.  Objectively, the radiation area was evaluated with regards to erythema, atrophy, scale, crusting, erosion, ulceration, edema, purpura, tenderness, warmth, drainage, and any other findings.  The plan was extensively reviewed including the dose, and dosing schedule.  The simulation and clinical setup was also reviewed as was the external and any internal shields and based on this review the appropriateness and sufficiency of treatment was determined.
Custom Shielding Preamble Text Will Not Be Included With Simple Simulations (.......... X X Y Cm): A lead shield of 0.762 mm thickness is utilized to form a molded, custom shield with a
Total Number Of Fractions: 16
Additional Prescription Justification Text: If there is any interruption in treatment exceeding 5 days please see Decay and Dose Adjustment Calculation and complete treatment under Prescription 2, 3 or 4 as appropriate.
Fractionation Number: 10
Dimensions-Y Axis In Cm: 0.1
Cumulative Dose In Cgy (Optional): 3205.0
Dose Per Fractionation In Cgy (Optional): 320.5
Custom Shielding Afterword Text Will Not Be Included With Simple Simulations (X X Y Cm............): port to correlate with the lesion size, including treatment margin. The custom lead shield is adequate to accommodate the appropriate applicator and provide adequate shielding around the treatment site. Additional shielding (as noted below) is used to protect sensitive, normal tissues.
Patient Positioning: Supine
Body Location Override (Optional): left lateral superior chest
Bill For Radiation Treatment: Yes
Other Assessment (Will Overide Current Assessment): ANNETTE=1
Assessment: Appropriate reaction
Body Location Override (Optional): right medial superior chest
Cumulative Dose In Cgy (Optional): 3205

## 2018-03-16 ENCOUNTER — APPOINTMENT (RX ONLY)
Dept: URBAN - METROPOLITAN AREA CLINIC 114 | Facility: CLINIC | Age: 63
Setting detail: DERMATOLOGY
End: 2018-03-16

## 2018-03-16 VITALS — HEIGHT: 70 IN | WEIGHT: 190 LBS

## 2018-03-16 PROBLEM — C44.519 BASAL CELL CARCINOMA OF SKIN OF OTHER PART OF TRUNK: Status: ACTIVE | Noted: 2018-03-16

## 2018-03-16 PROCEDURE — 77401: CPT

## 2018-03-16 PROCEDURE — ? SUPERFICIAL RADIATION TREATMENT

## 2018-03-16 NOTE — PROCEDURE: SUPERFICIAL RADIATION TREATMENT
Body Location Override (Optional): left lateral superior chest
Functional Status: 0 (fully active)
Depth (Optional-Please Include Units): 1.05 cm
Computed Treatment Time In Min (Will Render The Same As Calculated Treatment Time If Left Blank): .49
Field Size (Applicator): 2.0 cm
Bill For Simulation And Treatment Device Design: No
Detail Level: Detailed
Port Dimensions-X Axis In Cm: 0.8
Total Number Of Fractions Rx 4: 15
Cumulative Dose In Cgy (Optional): 3537.6
Dimensions-X Axis In Cm: 0.7
Simple Simulation Preamble Text Will Be Included With Simple Simulations (.......... Indications): Simple simulation was performed today for the following reasons:
Daily Fractionated Dose (Optional- Include Units): 320.5 cgy
Other Assessment (Will Overide Current Assessment): ANNETTE=1
Field Number: 2
Number Of Days Off Treatment: 1
Custom Shielding Preamble Text Will Not Be Included With Simple Simulations (.......... X X Y Cm): A lead shield of 0.762 mm thickness is utilized to form a molded, custom shield with a
Comments: patient consulted with GUERRERO re: scar treatment options from previous.xrt left posterior lower extremity/ng
Dimensions-Y Axis In Cm: 0.1
Initial Radiation Treatment Planning (Will Render If Bill Simulation = Yes): The patient had a complete consultation regarding all applicable modalities for the treatment of their skin cancer and based on a variety of factors including the type of tumor, size, and location, the relevant medical history as well as local tissue factors, the functional status of the individual, the ability to perform necessary postoperative wound instructions and the need for simultaneous treatments as well as overall wound healing status, it was determined that the patient would begin radiation therapy treatment for skin cancer.  A full simulation and treatment device design was performed including the determination and formulation of appropriate simple and complex devices including lead shield of 0.762 mm thickness to form molded customized shielding to specifically correlate with the lesion size including treatment margin.  The custom lead shield is adequate to accommodate the appropriate applicator and provide adequate shielding around the treatment site.  The specific field applicator, shields, and devices both simple and complex as well as the specific patient setup is outlined below.  The patient was given a full consent for superficial radiation to both verbally and in writing and the full determination of patient's eligibility for treatment and selection is outlined on the patient eligibility and treatment selection form.  The specific superficial radiotherapy prescription was determined and was documented on the superficial radiotherapy prescription form.  A treatment calculation was also performed and documented on the treatment calculation form.  Based on the prescription, the patient was scheduled for a series of fractional treatments.
Day Of The Week Treatment Administered: Friday
Fractions / Week Rx 4: 5
Assessment: Appropriate reaction
Energy Output In Cgy/Min (Optional): 651
Body Location Override (Optional): right medial superior chest
Dose / Tx In Cgy (Optional): 320.5
Time Dose Fractionation (Optional- Include Units If Applicable): 100 TDF
Energy Output In Cgy/Min (Optional): 70
Fractions / Week: 3
Intro Statement (Will Not Render If Left Blank): The patient is undergoing superficial radiation therapy for skin cancer and presents for weekly evaluation and management.  Per protocol and as documented on the flow sheet, the patient was questioned as to subjective redness, pruritus, pain, drainage, fatigue, or any other symptoms.  Objectively, the radiation area was evaluated with regards to erythema, atrophy, scale, crusting, erosion, ulceration, edema, purpura, tenderness, warmth, drainage, and any other findings.  The plan was extensively reviewed including the dose, and dosing schedule.  The simulation and clinical setup was also reviewed as was the external and any internal shields and based on this review the appropriateness and sufficiency of treatment was determined.
Treatment Time / Fractionation (Optional- Include Units): .49 min
Fractionation Number (Evaluation): 10
Total Number Of Fractions: 16
Render Text From Evaluation And Management Tab (Will Not Bill [id number]): Yes
Total Dose (Optional-Please Include Units): 5128 cgy
Fractionation Number: 11
Total Dose (Optional-Please Include Units) Rx 4: 5126.0 cGy
Energy (Optional-Please Include Units): 70 kv
Patient Positioning: Supine
Additional Prescription Justification Text: If there is any interruption in treatment exceeding 5 days please see Decay and Dose Adjustment Calculation and complete treatment under Prescription 2, 3 or 4 as appropriate.
Custom Shielding Afterword Text Will Not Be Included With Simple Simulations (X X Y Cm............): port to correlate with the lesion size, including treatment margin. The custom lead shield is adequate to accommodate the appropriate applicator and provide adequate shielding around the treatment site. Additional shielding (as noted below) is used to protect sensitive, normal tissues.
Cumulative Dose In Cgy (Optional): 3525.5
Treatment Margins In Cm: 0.5
Treatment Device Design After Initial Simulation Justification (Will Render If Bill For Treatment Devices = Yes): The patient is status post radiation simulation and is evaluated as to the use of additional devices for shielding and placement for radiation therapy.

## 2018-03-19 ENCOUNTER — APPOINTMENT (RX ONLY)
Dept: URBAN - METROPOLITAN AREA CLINIC 114 | Facility: CLINIC | Age: 63
Setting detail: DERMATOLOGY
End: 2018-03-19

## 2018-03-19 VITALS — WEIGHT: 190 LBS | HEIGHT: 70 IN

## 2018-03-19 PROBLEM — C44.519 BASAL CELL CARCINOMA OF SKIN OF OTHER PART OF TRUNK: Status: ACTIVE | Noted: 2018-03-19

## 2018-03-19 PROCEDURE — 77401: CPT

## 2018-03-19 PROCEDURE — ? SUPERFICIAL RADIATION TREATMENT

## 2018-03-19 NOTE — PROCEDURE: SUPERFICIAL RADIATION TREATMENT
Port Dimensions-X Axis In Cm: 0.7
Energy (Optional-Please Include Units): 70 kv
Custom Shielding Afterword Text Will Not Be Included With Simple Simulations (X X Y Cm............): port to correlate with the lesion size, including treatment margin. The custom lead shield is adequate to accommodate the appropriate applicator and provide adequate shielding around the treatment site. Additional shielding (as noted below) is used to protect sensitive, normal tissues.
Simple Simulation Afterword Text Will Be Included With Simple Simulations (Indications............): The patient had a complete consultation regarding all applicable modalities for the treatment of their skin cancer and based on a variety of factors including the type of tumor, size, and location, the relevant medical history as well as local tissue factors, the functional status of the individual, the ability to perform necessary postoperative wound instructions and the need for simultaneous treatments as well as overall wound healing status, it was determined that the patient would begin radiation therapy treatment for skin cancer.  A full simulation and treatment device design was performed including the determination and formulation of appropriate simple and complex devices including lead shield of 0.762 mm thickness to form molded customized shielding to specifically correlate with the lesion size including treatment margin.  The custom lead shield is adequate to accommodate the appropriate applicator and provide adequate shielding around the treatment site.  The specific field applicator, shields, and devices both simple and complex as well as the specific patient setup is outlined below.  The patient was given a full consent for superficial radiation to both verbally and in writing and the full determination of patient's eligibility for treatment and selection is outlined on the patient eligibility and treatment selection form.  The specific superficial radiotherapy prescription was determined and was documented on the superficial radiotherapy prescription form.  A treatment calculation was also performed and documented on the treatment calculation form.  Based on the prescription, the patient was scheduled for a series of fractional treatments.
Dose / Tx In Cgy (Optional): 320.5
Ssd In Cm (Optional): 15
Custom Shielding Preamble Text Will Not Be Included With Simple Simulations (.......... X X Y Cm): A lead shield of 0.762 mm thickness is utilized to form a molded, custom shield with a
Shielding Size (Optional- Include Units): 2.0 cm
Total Number Of Fractions: 16
Intro Statement (Will Not Render If Left Blank): The patient is undergoing superficial radiation therapy for skin cancer and presents for weekly evaluation and management.  Per protocol and as documented on the flow sheet, the patient was questioned as to subjective redness, pruritus, pain, drainage, fatigue, or any other symptoms.  Objectively, the radiation area was evaluated with regards to erythema, atrophy, scale, crusting, erosion, ulceration, edema, purpura, tenderness, warmth, drainage, and any other findings.  The plan was extensively reviewed including the dose, and dosing schedule.  The simulation and clinical setup was also reviewed as was the external and any internal shields and based on this review the appropriateness and sufficiency of treatment was determined.
Treatment Device Design After Initial Simulation Justification (Will Render If Bill For Treatment Devices = Yes): The patient is status post radiation simulation and is evaluated as to the use of additional devices for shielding and placement for radiation therapy.
Render Additional Prescriptions In Note?: No
Total Dose (Optional-Please Include Units): 5128 cgy
Treatment Time In Min (Optional): .49
Simple Simulation Preamble Text Will Be Included With Simple Simulations (.......... Indications): Simple simulation was performed today for the following reasons:
Daily Fractionated Dose (Optional- Include Units): 320.5 cgy
Port Dimensions-X Axis In Cm: 0.8
Time Dose Fractionation (Optional- Include Units If Applicable): 100 TDF
Additional Prescription Justification Text: If there is any interruption in treatment exceeding 5 days please see Decay and Dose Adjustment Calculation and complete treatment under Prescription 2, 3 or 4 as appropriate.
Detail Level: Detailed
Fractions / Week Rx 3: 5
Body Location Override (Optional): left lateral superior chest
Total Dose (Optional-Please Include Units) Rx 4: 5126.0 cGy
Bill For Radiation Treatment: Yes
Number Of Days Off Treatment: 1
Patient Positioning: Supine
Cumulative Dose In Cgy (Optional): 3846.0
Functional Status: 0 (fully active)
Fractionation Number: 12
Comments: patient consulted with GUERRERO re: scar treatment options from previous.xrt left posterior lower extremity/ng
Energy Output In Cgy/Min (Optional): 70
Other Assessment (Will Overide Current Assessment): ANNETTE=1
Depth (Optional-Please Include Units): 1.05 cm
Treatment Margins In Cm: 0.5
Assessment: Appropriate reaction
Day Of The Week Treatment Administered: Monday
Energy Output In Cgy/Min (Optional): 651
Prescription Used: 2
Treatment Time / Fractionation (Optional- Include Units): .49 min
Dimensions-Y Axis In Cm: 0.1
Fractions / Week: 3
Fractionation Number (Evaluation): 11
Body Location Override (Optional): right medial superior chest
Fractionation Number (Evaluation): 10

## 2018-03-21 ENCOUNTER — APPOINTMENT (RX ONLY)
Dept: URBAN - METROPOLITAN AREA CLINIC 114 | Facility: CLINIC | Age: 63
Setting detail: DERMATOLOGY
End: 2018-03-21

## 2018-03-21 VITALS — HEIGHT: 70 IN | WEIGHT: 190 LBS

## 2018-03-21 DIAGNOSIS — L58.9 RADIODERMATITIS, UNSPECIFIED: ICD-10-CM

## 2018-03-21 PROBLEM — C44.519 BASAL CELL CARCINOMA OF SKIN OF OTHER PART OF TRUNK: Status: ACTIVE | Noted: 2018-03-21

## 2018-03-21 PROCEDURE — ? SUPERFICIAL RADIATION TREATMENT

## 2018-03-21 PROCEDURE — 99213 OFFICE O/P EST LOW 20 MIN: CPT | Mod: 25

## 2018-03-21 PROCEDURE — 77401: CPT

## 2018-03-21 PROCEDURE — ? COUNSELING

## 2018-03-21 NOTE — PROCEDURE: SUPERFICIAL RADIATION TREATMENT
Total Number Of Fractions Rx 3: 15
Bill For Simulation And Treatment Device Design: No
Initial Radiation Treatment Planning (Will Render If Bill Simulation = Yes): The patient had a complete consultation regarding all applicable modalities for the treatment of their skin cancer and based on a variety of factors including the type of tumor, size, and location, the relevant medical history as well as local tissue factors, the functional status of the individual, the ability to perform necessary postoperative wound instructions and the need for simultaneous treatments as well as overall wound healing status, it was determined that the patient would begin radiation therapy treatment for skin cancer.  A full simulation and treatment device design was performed including the determination and formulation of appropriate simple and complex devices including lead shield of 0.762 mm thickness to form molded customized shielding to specifically correlate with the lesion size including treatment margin.  The custom lead shield is adequate to accommodate the appropriate applicator and provide adequate shielding around the treatment site.  The specific field applicator, shields, and devices both simple and complex as well as the specific patient setup is outlined below.  The patient was given a full consent for superficial radiation to both verbally and in writing and the full determination of patient's eligibility for treatment and selection is outlined on the patient eligibility and treatment selection form.  The specific superficial radiotherapy prescription was determined and was documented on the superficial radiotherapy prescription form.  A treatment calculation was also performed and documented on the treatment calculation form.  Based on the prescription, the patient was scheduled for a series of fractional treatments.
Dose / Tx In Cgy (Optional): 320.5
Dimensions-Y Axis In Cm: 0.1
Body Location Override (Optional): left lateral superior chest
Number Of Treatment Days: 1
Energy (Optional-Please Include Units): 70 kv
Functional Status: 0 (fully active)
Treatment Device Design After Initial Simulation Justification (Will Render If Bill For Treatment Devices = Yes): The patient is status post radiation simulation and is evaluated as to the use of additional devices for shielding and placement for radiation therapy.
Depth (Optional-Please Include Units): 1.05 cm
Intro Statement (Will Not Render If Left Blank): The patient is undergoing superficial radiation therapy for skin cancer and presents for weekly evaluation and management.  Per protocol and as documented on the flow sheet, the patient was questioned as to subjective redness, pruritus, pain, drainage, fatigue, or any other symptoms.  Objectively, the radiation area was evaluated with regards to erythema, atrophy, scale, crusting, erosion, ulceration, edema, purpura, tenderness, warmth, drainage, and any other findings.  The plan was extensively reviewed including the dose, and dosing schedule.  The simulation and clinical setup was also reviewed as was the external and any internal shields and based on this review the appropriateness and sufficiency of treatment was determined.
Simple Simulation Preamble Text Will Be Included With Simple Simulations (.......... Indications): Simple simulation was performed today for the following reasons:
Energy Output In Cgy/Min (Optional): 70
Treatment Time / Fractionation (Optional- Include Units): .49 min
Fractions / Week: 3
Dimensions-Y Axis In Cm: 0.7
Treatment Time In Min (Optional): .49
Additional Prescription Justification Text: If there is any interruption in treatment exceeding 5 days please see Decay and Dose Adjustment Calculation and complete treatment under Prescription 2, 3 or 4 as appropriate.
Fractions / Week Rx 3: 5
Daily Fractionated Dose (Optional- Include Units): 320.5 cgy
Assessment: Appropriate reaction
Field Size (Applicator): 2.0 cm
Body Location Override (Optional): right medial superior chest
Time Dose Fractionation (Optional- Include Units If Applicable): 100 TDF
Total Dose (Optional-Please Include Units): 5128 cgy
Fractionation Number: 13
Day Of The Week Treatment Administered: Wednesday
Custom Shielding Preamble Text Will Not Be Included With Simple Simulations (.......... X X Y Cm): A lead shield of 0.762 mm thickness is utilized to form a molded, custom shield with a
Patient Positioning: Supine
Custom Shielding Afterword Text Will Not Be Included With Simple Simulations (X X Y Cm............): port to correlate with the lesion size, including treatment margin. The custom lead shield is adequate to accommodate the appropriate applicator and provide adequate shielding around the treatment site. Additional shielding (as noted below) is used to protect sensitive, normal tissues.
Cumulative Dose In Cgy (Optional): 4166.5
Total Dose (Optional-Please Include Units) Rx 4: 5126.0 cGy
Bill For Radiation Treatment: Yes
Port Dimensions-X Axis In Cm: 0.8
Total Number Of Fractions: 16
Detail Level: Detailed
Port Dimensions-Y Axis In Cm: 0.5
Field Number: 2
Other Assessment (Will Overide Current Assessment): ANNETTE=1
Comments: patient consulted with GUERRERO re: scar treatment options from previous.xrt left posterior lower extremity/ng
Energy Output In Cgy/Min (Optional): 650

## 2018-03-21 NOTE — HPI: SKIN LESION (BASAL CELL CARCINOMA)
How Severe Is Your Skin Cancer?: severe
Is This A New Presentation, Or A Follow-Up?: Basal Cell Carcinoma

## 2018-03-26 ENCOUNTER — APPOINTMENT (RX ONLY)
Dept: URBAN - METROPOLITAN AREA CLINIC 114 | Facility: CLINIC | Age: 63
Setting detail: DERMATOLOGY
End: 2018-03-26

## 2018-03-26 PROBLEM — C44.519 BASAL CELL CARCINOMA OF SKIN OF OTHER PART OF TRUNK: Status: ACTIVE | Noted: 2018-03-26

## 2018-03-26 PROCEDURE — 77401: CPT

## 2018-03-26 PROCEDURE — ? SUPERFICIAL RADIATION TREATMENT

## 2018-03-26 NOTE — PROCEDURE: SUPERFICIAL RADIATION TREATMENT
Patient Positioning: Supine
Energy (Include Units): 70
Total Number Of Fractions Rx 4: 15
Treatment Margins In Cm: 0.5
Fractions / Week Rx 4: 5
Total Dose (Optional-Please Include Units): 5128 cgy
Treatment Device Design After Initial Simulation Justification (Will Render If Bill For Treatment Devices = Yes): The patient is status post radiation simulation and is evaluated as to the use of additional devices for shielding and placement for radiation therapy.
Render Prescriptions In Note?: No
Dimensions-X Axis In Cm: 0.8
Field Size (Applicator): 2.0 cm
Additional Prescription Justification Text: If there is any interruption in treatment exceeding 5 days please see Decay and Dose Adjustment Calculation and complete treatment under Prescription 2, 3 or 4 as appropriate.
Fractions / Week: 3
Number Of Days Off Treatment: 1
Computed Treatment Time In Min (Will Render The Same As Calculated Treatment Time If Left Blank): .49
Energy (Optional-Please Include Units): 70 kv
Custom Shielding Preamble Text Will Not Be Included With Simple Simulations (.......... X X Y Cm): A lead shield of 0.762 mm thickness is utilized to form a molded, custom shield with a
Detail Level: Detailed
Port Dimensions-Y Axis In Cm: 0.7
Fractionation Number: 13
Initial Radiation Treatment Planning (Will Render If Bill Simulation = Yes): The patient had a complete consultation regarding all applicable modalities for the treatment of their skin cancer and based on a variety of factors including the type of tumor, size, and location, the relevant medical history as well as local tissue factors, the functional status of the individual, the ability to perform necessary postoperative wound instructions and the need for simultaneous treatments as well as overall wound healing status, it was determined that the patient would begin radiation therapy treatment for skin cancer.  A full simulation and treatment device design was performed including the determination and formulation of appropriate simple and complex devices including lead shield of 0.762 mm thickness to form molded customized shielding to specifically correlate with the lesion size including treatment margin.  The custom lead shield is adequate to accommodate the appropriate applicator and provide adequate shielding around the treatment site.  The specific field applicator, shields, and devices both simple and complex as well as the specific patient setup is outlined below.  The patient was given a full consent for superficial radiation to both verbally and in writing and the full determination of patient's eligibility for treatment and selection is outlined on the patient eligibility and treatment selection form.  The specific superficial radiotherapy prescription was determined and was documented on the superficial radiotherapy prescription form.  A treatment calculation was also performed and documented on the treatment calculation form.  Based on the prescription, the patient was scheduled for a series of fractional treatments.
Cumulative Dose In Cgy (Optional): 4487.0
Assessment: Appropriate reaction
Custom Shielding Afterword Text Will Not Be Included With Simple Simulations (X X Y Cm............): port to correlate with the lesion size, including treatment margin. The custom lead shield is adequate to accommodate the appropriate applicator and provide adequate shielding around the treatment site. Additional shielding (as noted below) is used to protect sensitive, normal tissues.
Body Location Override (Optional): right medial superior chest
Other Assessment (Will Overide Current Assessment): ANNETTE=1
Dimensions-Y Axis In Cm: 0.1
Functional Status: 0 (fully active)
Treatment Time / Fractionation (Optional- Include Units): .49 min
Daily Fractionated Dose (Optional- Include Units): 320.5 cgy
Time Dose Fractionation (Optional- Include Units If Applicable): 100 TDF
Fractionation Number: 14
Total Dose (Optional-Please Include Units) Rx 4: 5126.0 cGy
Depth (Optional-Please Include Units): 1.05 cm
Dose Per Fractionation In Cgy (Optional): 320.5
Energy Output In Cgy/Min (Optional): 657
Field Number: 2
Total Number Of Fractions: 16
Body Location Override (Optional): left lateral superior chest
Intro Statement (Will Not Render If Left Blank): The patient is undergoing superficial radiation therapy for skin cancer and presents for weekly evaluation and management.  Per protocol and as documented on the flow sheet, the patient was questioned as to subjective redness, pruritus, pain, drainage, fatigue, or any other symptoms.  Objectively, the radiation area was evaluated with regards to erythema, atrophy, scale, crusting, erosion, ulceration, edema, purpura, tenderness, warmth, drainage, and any other findings.  The plan was extensively reviewed including the dose, and dosing schedule.  The simulation and clinical setup was also reviewed as was the external and any internal shields and based on this review the appropriateness and sufficiency of treatment was determined.
Bill For Radiation Treatment: Yes
Comments: patient consulted with GUERRERO re: scar treatment options from previous.xrt left posterior lower extremity/ng
Simple Simulation Preamble Text Will Be Included With Simple Simulations (.......... Indications): Simple simulation was performed today for the following reasons:
Day Of The Week Treatment Administered: Friday
Day Of The Week Treatment Administered: Monday

## 2018-03-28 ENCOUNTER — APPOINTMENT (RX ONLY)
Dept: URBAN - METROPOLITAN AREA CLINIC 114 | Facility: CLINIC | Age: 63
Setting detail: DERMATOLOGY
End: 2018-03-28

## 2018-03-28 DIAGNOSIS — L58.9 RADIODERMATITIS, UNSPECIFIED: ICD-10-CM

## 2018-03-28 PROBLEM — J45.909 UNSPECIFIED ASTHMA, UNCOMPLICATED: Status: ACTIVE | Noted: 2018-03-28

## 2018-03-28 PROBLEM — C44.519 BASAL CELL CARCINOMA OF SKIN OF OTHER PART OF TRUNK: Status: ACTIVE | Noted: 2018-03-28

## 2018-03-28 PROCEDURE — 99213 OFFICE O/P EST LOW 20 MIN: CPT | Mod: 25

## 2018-03-28 PROCEDURE — 77401: CPT

## 2018-03-28 PROCEDURE — ? SUPERFICIAL RADIATION TREATMENT

## 2018-03-28 PROCEDURE — ? COUNSELING

## 2018-03-28 NOTE — PROCEDURE: SUPERFICIAL RADIATION TREATMENT
Fractions / Week Rx 2: 5
Fractionation Number: 15
Assessment: Appropriate reaction
Energy (Optional-Please Include Units): 70 kv
Bill For Treatment Devices Only: No
Comments: patient consulted with GUERRERO re: scar treatment options from previous.xrt left posterior lower extremity/ng
Simple Simulation Preamble Text Will Be Included With Simple Simulations (.......... Indications): Simple simulation was performed today for the following reasons:
Field Size (Applicator): 2.0 cm
Dose Per Fractionation In Cgy (Optional): 320.5
Number Of Treatment Days: 1
Treatment Margins In Cm: 0.5
Detail Level: Detailed
Simple Simulation Afterword Text Will Be Included With Simple Simulations (Indications............): The patient had a complete consultation regarding all applicable modalities for the treatment of their skin cancer and based on a variety of factors including the type of tumor, size, and location, the relevant medical history as well as local tissue factors, the functional status of the individual, the ability to perform necessary postoperative wound instructions and the need for simultaneous treatments as well as overall wound healing status, it was determined that the patient would begin radiation therapy treatment for skin cancer.  A full simulation and treatment device design was performed including the determination and formulation of appropriate simple and complex devices including lead shield of 0.762 mm thickness to form molded customized shielding to specifically correlate with the lesion size including treatment margin.  The custom lead shield is adequate to accommodate the appropriate applicator and provide adequate shielding around the treatment site.  The specific field applicator, shields, and devices both simple and complex as well as the specific patient setup is outlined below.  The patient was given a full consent for superficial radiation to both verbally and in writing and the full determination of patient's eligibility for treatment and selection is outlined on the patient eligibility and treatment selection form.  The specific superficial radiotherapy prescription was determined and was documented on the superficial radiotherapy prescription form.  A treatment calculation was also performed and documented on the treatment calculation form.  Based on the prescription, the patient was scheduled for a series of fractional treatments.
Functional Status: 0 (fully active)
Other Assessment (Will Overide Current Assessment): ANNETTE=1
Total Number Of Fractions: 16
Total Dose (Optional-Please Include Units) Rx 4: 5126.0 cGy
Render Text From Evaluation And Management Tab (Will Not Bill 49166): Yes
Time Dose Fractionation (Optional- Include Units If Applicable): 100 TDF
Energy Output In Cgy/Min (Optional): 651
Dimensions-Y Axis In Cm: 0.7
Dimensions-X Axis In Cm: 0.8
Day Of The Week Treatment Administered: Wednesday
Body Location Override (Optional): left lateral superior chest
Daily Fractionated Dose (Optional- Include Units): 320.5 cgy
Fractions / Week: 3
Computed Treatment Time In Min (Will Render The Same As Calculated Treatment Time If Left Blank): .49
Additional Prescription Justification Text: If there is any interruption in treatment exceeding 5 days please see Decay and Dose Adjustment Calculation and complete treatment under Prescription 2, 3 or 4 as appropriate.
Patient Positioning: Supine
Treatment Device Design After Initial Simulation Justification (Will Render If Bill For Treatment Devices = Yes): The patient is status post radiation simulation and is evaluated as to the use of additional devices for shielding and placement for radiation therapy.
Custom Shielding Preamble Text Will Not Be Included With Simple Simulations (.......... X X Y Cm): A lead shield of 0.762 mm thickness is utilized to form a molded, custom shield with a
Total Dose (Optional-Please Include Units): 5128 cgy
Intro Statement (Will Not Render If Left Blank): The patient is undergoing superficial radiation therapy for skin cancer and presents for weekly evaluation and management.  Per protocol and as documented on the flow sheet, the patient was questioned as to subjective redness, pruritus, pain, drainage, fatigue, or any other symptoms.  Objectively, the radiation area was evaluated with regards to erythema, atrophy, scale, crusting, erosion, ulceration, edema, purpura, tenderness, warmth, drainage, and any other findings.  The plan was extensively reviewed including the dose, and dosing schedule.  The simulation and clinical setup was also reviewed as was the external and any internal shields and based on this review the appropriateness and sufficiency of treatment was determined.
Treatment Time / Fractionation (Optional- Include Units): .49 min
Custom Shielding Afterword Text Will Not Be Included With Simple Simulations (X X Y Cm............): port to correlate with the lesion size, including treatment margin. The custom lead shield is adequate to accommodate the appropriate applicator and provide adequate shielding around the treatment site. Additional shielding (as noted below) is used to protect sensitive, normal tissues.
Cumulative Dose In Cgy (Optional): 4807.5
Depth (Optional-Please Include Units): 1.05 cm

## 2018-03-30 ENCOUNTER — APPOINTMENT (RX ONLY)
Dept: URBAN - METROPOLITAN AREA CLINIC 114 | Facility: CLINIC | Age: 63
Setting detail: DERMATOLOGY
End: 2018-03-30

## 2018-03-30 PROBLEM — C44.519 BASAL CELL CARCINOMA OF SKIN OF OTHER PART OF TRUNK: Status: ACTIVE | Noted: 2018-03-30

## 2018-03-30 PROCEDURE — ? SUPERFICIAL RADIATION TREATMENT

## 2018-03-30 PROCEDURE — 77401: CPT

## 2018-03-30 NOTE — PROCEDURE: SUPERFICIAL RADIATION TREATMENT
Long For Simulation Without Treatment Device Design (Simple Simulation): No
Total Dose (Optional-Please Include Units) Rx 4: 5126.0 cGy
Treatment Margins In Cm: 0.5
Daily Fractionated Dose (Optional- Include Units): 320.5 cgy
Energy Output In Cgy/Min (Optional): 70
Functional Status: 0 (fully active)
Dimensions-X Axis In Cm: 0.7
Fractions / Week Rx 4: 5
Time Dose Fractionation (Optional- Include Units If Applicable): 100 TDF
Dimensions-X Axis In Cm: 0.8
Field Number: 2
Other Assessment (Will Overide Current Assessment): ANNETTE=1
Depth (Optional-Please Include Units): 1.05 cm
Initial Radiation Treatment Planning (Will Render If Bill Simulation = Yes): The patient had a complete consultation regarding all applicable modalities for the treatment of their skin cancer and based on a variety of factors including the type of tumor, size, and location, the relevant medical history as well as local tissue factors, the functional status of the individual, the ability to perform necessary postoperative wound instructions and the need for simultaneous treatments as well as overall wound healing status, it was determined that the patient would begin radiation therapy treatment for skin cancer.  A full simulation and treatment device design was performed including the determination and formulation of appropriate simple and complex devices including lead shield of 0.762 mm thickness to form molded customized shielding to specifically correlate with the lesion size including treatment margin.  The custom lead shield is adequate to accommodate the appropriate applicator and provide adequate shielding around the treatment site.  The specific field applicator, shields, and devices both simple and complex as well as the specific patient setup is outlined below.  The patient was given a full consent for superficial radiation to both verbally and in writing and the full determination of patient's eligibility for treatment and selection is outlined on the patient eligibility and treatment selection form.  The specific superficial radiotherapy prescription was determined and was documented on the superficial radiotherapy prescription form.  A treatment calculation was also performed and documented on the treatment calculation form.  Based on the prescription, the patient was scheduled for a series of fractional treatments.
Energy (Include Units): 70 kV
Patient Positioning: Supine
Detail Level: Detailed
Treatment Device Design After Initial Simulation Justification (Will Render If Bill For Treatment Devices = Yes): The patient is status post radiation simulation and is evaluated as to the use of additional devices for shielding and placement for radiation therapy.
Fractionation Number: 16
Treatment Time In Min (Optional): .49
Total Number Of Fractions Rx 2: 15
Intro Statement (Will Not Render If Left Blank): The patient is undergoing superficial radiation therapy for skin cancer and presents for weekly evaluation and management.  Per protocol and as documented on the flow sheet, the patient was questioned as to subjective redness, pruritus, pain, drainage, fatigue, or any other symptoms.  Objectively, the radiation area was evaluated with regards to erythema, atrophy, scale, crusting, erosion, ulceration, edema, purpura, tenderness, warmth, drainage, and any other findings.  The plan was extensively reviewed including the dose, and dosing schedule.  The simulation and clinical setup was also reviewed as was the external and any internal shields and based on this review the appropriateness and sufficiency of treatment was determined.
Shielding Size (Optional- Include Units): 2.0 cm
Total Dose (Optional-Please Include Units): 5128 cgy
Number Of Treatment Days: 1
Custom Shielding Afterword Text Will Not Be Included With Simple Simulations (X X Y Cm............): port to correlate with the lesion size, including treatment margin. The custom lead shield is adequate to accommodate the appropriate applicator and provide adequate shielding around the treatment site. Additional shielding (as noted below) is used to protect sensitive, normal tissues.
Additional Prescription Justification Text: If there is any interruption in treatment exceeding 5 days please see Decay and Dose Adjustment Calculation and complete treatment under Prescription 2, 3 or 4 as appropriate.
Fractionation Number (Evaluation): 13
Bill For Radiation Treatment: Yes
Energy Output In Cgy/Min (Optional): 651
Treatment Time / Fractionation (Optional- Include Units): .49 min
Simple Simulation Preamble Text Will Be Included With Simple Simulations (.......... Indications): Simple simulation was performed today for the following reasons:
Cumulative Dose In Cgy (Optional): 5128.0
Custom Shielding Preamble Text Will Not Be Included With Simple Simulations (.......... X X Y Cm): A lead shield of 0.762 mm thickness is utilized to form a molded, custom shield with a
Comments: patient consulted with GUERRERO re: scar treatment options from previous.xrt left posterior lower extremity/ng
Assessment: Appropriate reaction
Day Of The Week Treatment Administered: Friday
Body Location Override (Optional): right medial superior chest
Dose Per Fractionation In Cgy (Optional): 320.5
Body Location Override (Optional): left lateral superior chest
Fractions / Week: 3

## 2018-05-03 ENCOUNTER — APPOINTMENT (RX ONLY)
Dept: URBAN - METROPOLITAN AREA CLINIC 114 | Facility: CLINIC | Age: 63
Setting detail: DERMATOLOGY
End: 2018-05-03

## 2018-05-03 DIAGNOSIS — Z85.828 PERSONAL HISTORY OF OTHER MALIGNANT NEOPLASM OF SKIN: ICD-10-CM

## 2018-05-03 DIAGNOSIS — F42.4 EXCORIATION (SKIN-PICKING) DISORDER: ICD-10-CM

## 2018-05-03 PROBLEM — S80.922A UNSPECIFIED SUPERFICIAL INJURY OF LEFT LOWER LEG, INITIAL ENCOUNTER: Status: ACTIVE | Noted: 2018-05-03

## 2018-05-03 PROCEDURE — 99213 OFFICE O/P EST LOW 20 MIN: CPT

## 2018-05-03 PROCEDURE — ? COUNSELING

## 2018-05-03 ASSESSMENT — LOCATION DETAILED DESCRIPTION DERM
LOCATION DETAILED: RIGHT LATERAL SUPERIOR CHEST
LOCATION DETAILED: LEFT LATERAL SUPERIOR CHEST
LOCATION DETAILED: LEFT DISTAL CALF

## 2018-05-03 ASSESSMENT — LOCATION SIMPLE DESCRIPTION DERM
LOCATION SIMPLE: LEFT CALF
LOCATION SIMPLE: CHEST

## 2018-05-03 ASSESSMENT — LOCATION ZONE DERM
LOCATION ZONE: LEG
LOCATION ZONE: TRUNK

## 2018-05-03 NOTE — HPI: SKIN LESION (BASAL CELL CARCINOMA)
How Severe Is Your Skin Cancer?: moderate
Is This A New Presentation, Or A Follow-Up?: Follow Up Basal Cell Carcinoma
Additional History: 6 week check after XRT

## 2018-07-30 ENCOUNTER — APPOINTMENT (RX ONLY)
Dept: URBAN - METROPOLITAN AREA CLINIC 114 | Facility: CLINIC | Age: 63
Setting detail: DERMATOLOGY
End: 2018-07-30

## 2018-07-30 VITALS — WEIGHT: 194 LBS | HEIGHT: 70 IN

## 2018-07-30 DIAGNOSIS — D22 MELANOCYTIC NEVI: ICD-10-CM

## 2018-07-30 DIAGNOSIS — L81.4 OTHER MELANIN HYPERPIGMENTATION: ICD-10-CM

## 2018-07-30 DIAGNOSIS — Z85.828 PERSONAL HISTORY OF OTHER MALIGNANT NEOPLASM OF SKIN: ICD-10-CM

## 2018-07-30 DIAGNOSIS — D18.0 HEMANGIOMA: ICD-10-CM

## 2018-07-30 PROBLEM — D22.71 MELANOCYTIC NEVI OF RIGHT LOWER LIMB, INCLUDING HIP: Status: ACTIVE | Noted: 2018-07-30

## 2018-07-30 PROBLEM — D22.61 MELANOCYTIC NEVI OF RIGHT UPPER LIMB, INCLUDING SHOULDER: Status: ACTIVE | Noted: 2018-07-30

## 2018-07-30 PROBLEM — D22.72 MELANOCYTIC NEVI OF LEFT LOWER LIMB, INCLUDING HIP: Status: ACTIVE | Noted: 2018-07-30

## 2018-07-30 PROBLEM — D22.62 MELANOCYTIC NEVI OF LEFT UPPER LIMB, INCLUDING SHOULDER: Status: ACTIVE | Noted: 2018-07-30

## 2018-07-30 PROBLEM — D18.01 HEMANGIOMA OF SKIN AND SUBCUTANEOUS TISSUE: Status: ACTIVE | Noted: 2018-07-30

## 2018-07-30 PROBLEM — D22.5 MELANOCYTIC NEVI OF TRUNK: Status: ACTIVE | Noted: 2018-07-30

## 2018-07-30 PROCEDURE — ? COUNSELING

## 2018-07-30 PROCEDURE — 99214 OFFICE O/P EST MOD 30 MIN: CPT

## 2018-07-30 ASSESSMENT — LOCATION DETAILED DESCRIPTION DERM
LOCATION DETAILED: RIGHT PROXIMAL POSTERIOR UPPER ARM
LOCATION DETAILED: LEFT PROXIMAL POSTERIOR UPPER ARM
LOCATION DETAILED: RIGHT ANTERIOR PROXIMAL THIGH
LOCATION DETAILED: RIGHT ANTERIOR DISTAL UPPER ARM
LOCATION DETAILED: RIGHT SUPERIOR LATERAL MALAR CHEEK
LOCATION DETAILED: LOWER STERNUM
LOCATION DETAILED: LEFT ANTERIOR DISTAL UPPER ARM
LOCATION DETAILED: UPPER STERNUM
LOCATION DETAILED: LEFT ANTERIOR DISTAL THIGH
LOCATION DETAILED: LEFT LATERAL DISTAL CALF
LOCATION DETAILED: LEFT PROXIMAL DORSAL FOREARM
LOCATION DETAILED: RIGHT MEDIAL UPPER BACK
LOCATION DETAILED: XIPHOID
LOCATION DETAILED: SUPERIOR THORACIC SPINE
LOCATION DETAILED: LEFT DISTAL POSTERIOR THIGH
LOCATION DETAILED: RIGHT PROXIMAL POSTERIOR THIGH
LOCATION DETAILED: EPIGASTRIC SKIN
LOCATION DETAILED: RIGHT PROXIMAL DORSAL FOREARM
LOCATION DETAILED: LEFT ANTERIOR PROXIMAL THIGH
LOCATION DETAILED: INFERIOR THORACIC SPINE

## 2018-07-30 ASSESSMENT — LOCATION SIMPLE DESCRIPTION DERM
LOCATION SIMPLE: RIGHT UPPER BACK
LOCATION SIMPLE: ABDOMEN
LOCATION SIMPLE: UPPER BACK
LOCATION SIMPLE: LEFT LOWER LEG
LOCATION SIMPLE: RIGHT THIGH
LOCATION SIMPLE: RIGHT CHEEK
LOCATION SIMPLE: CHEST
LOCATION SIMPLE: LEFT POSTERIOR THIGH
LOCATION SIMPLE: LEFT THIGH
LOCATION SIMPLE: RIGHT FOREARM
LOCATION SIMPLE: RIGHT POSTERIOR THIGH
LOCATION SIMPLE: LEFT FOREARM
LOCATION SIMPLE: LEFT UPPER ARM
LOCATION SIMPLE: RIGHT POSTERIOR UPPER ARM
LOCATION SIMPLE: RIGHT UPPER ARM
LOCATION SIMPLE: LEFT POSTERIOR UPPER ARM

## 2018-07-30 ASSESSMENT — LOCATION ZONE DERM
LOCATION ZONE: FACE
LOCATION ZONE: ARM
LOCATION ZONE: TRUNK
LOCATION ZONE: LEG

## 2019-01-28 ENCOUNTER — APPOINTMENT (RX ONLY)
Dept: URBAN - METROPOLITAN AREA CLINIC 114 | Facility: CLINIC | Age: 64
Setting detail: DERMATOLOGY
End: 2019-01-28

## 2019-01-28 VITALS — HEIGHT: 69.5 IN | WEIGHT: 193 LBS

## 2019-01-28 DIAGNOSIS — L82.1 OTHER SEBORRHEIC KERATOSIS: ICD-10-CM

## 2019-01-28 DIAGNOSIS — L81.4 OTHER MELANIN HYPERPIGMENTATION: ICD-10-CM

## 2019-01-28 DIAGNOSIS — Z85.828 PERSONAL HISTORY OF OTHER MALIGNANT NEOPLASM OF SKIN: ICD-10-CM

## 2019-01-28 DIAGNOSIS — T1490XA CONTUSION OF UNSPECIFIED SITE: ICD-10-CM

## 2019-01-28 DIAGNOSIS — D18.0 HEMANGIOMA: ICD-10-CM

## 2019-01-28 PROBLEM — D18.01 HEMANGIOMA OF SKIN AND SUBCUTANEOUS TISSUE: Status: ACTIVE | Noted: 2019-01-28

## 2019-01-28 PROBLEM — S60.021A CONTUSION OF RIGHT INDEX FINGER WITHOUT DAMAGE TO NAIL, INITIAL ENCOUNTER: Status: ACTIVE | Noted: 2019-01-28

## 2019-01-28 PROCEDURE — ? COUNSELING

## 2019-01-28 PROCEDURE — ? ADDITIONAL NOTES

## 2019-01-28 PROCEDURE — 99214 OFFICE O/P EST MOD 30 MIN: CPT

## 2019-01-28 ASSESSMENT — LOCATION SIMPLE DESCRIPTION DERM
LOCATION SIMPLE: RIGHT UPPER BACK
LOCATION SIMPLE: LEFT BREAST
LOCATION SIMPLE: LEFT UPPER BACK
LOCATION SIMPLE: LEFT SHOULDER
LOCATION SIMPLE: RIGHT FOREARM
LOCATION SIMPLE: LEFT THIGH
LOCATION SIMPLE: RIGHT BACK
LOCATION SIMPLE: LEFT FOREARM
LOCATION SIMPLE: RIGHT THIGH
LOCATION SIMPLE: CHEST
LOCATION SIMPLE: RIGHT INDEX FINGER
LOCATION SIMPLE: ABDOMEN
LOCATION SIMPLE: LEFT CALF
LOCATION SIMPLE: LEFT ELBOW

## 2019-01-28 ASSESSMENT — LOCATION DETAILED DESCRIPTION DERM
LOCATION DETAILED: RIGHT PROXIMAL RADIAL DORSAL FOREARM
LOCATION DETAILED: LEFT ANTERIOR DISTAL THIGH
LOCATION DETAILED: RIGHT DISTAL DORSAL FOREARM
LOCATION DETAILED: RIGHT MID-UPPER BACK
LOCATION DETAILED: RIGHT SUPERIOR LATERAL UPPER BACK
LOCATION DETAILED: LEFT SUPERIOR UPPER BACK
LOCATION DETAILED: RIGHT PROXIMAL DORSAL INDEX FINGER
LOCATION DETAILED: LEFT ELBOW
LOCATION DETAILED: LEFT POSTERIOR SHOULDER
LOCATION DETAILED: RIGHT LATERAL SUPERIOR CHEST
LOCATION DETAILED: RIGHT ANTERIOR DISTAL THIGH
LOCATION DETAILED: LEFT DISTAL CALF
LOCATION DETAILED: LEFT PROXIMAL RADIAL DORSAL FOREARM
LOCATION DETAILED: PERIUMBILICAL SKIN
LOCATION DETAILED: LEFT LATERAL SUPERIOR CHEST
LOCATION DETAILED: LEFT LATERAL BREAST 1-2:00 REGION

## 2019-01-28 ASSESSMENT — LOCATION ZONE DERM
LOCATION ZONE: TRUNK
LOCATION ZONE: ARM
LOCATION ZONE: FINGER
LOCATION ZONE: LEG

## 2019-01-28 NOTE — PROCEDURE: ADDITIONAL NOTES
Additional Notes: Will watch this spot for changes. If not resolved in two months will return for follow up.
Detail Level: Detailed

## 2020-02-11 ENCOUNTER — APPOINTMENT (RX ONLY)
Dept: URBAN - METROPOLITAN AREA CLINIC 114 | Facility: CLINIC | Age: 65
Setting detail: DERMATOLOGY
End: 2020-02-11

## 2020-02-11 VITALS — WEIGHT: 200 LBS | HEIGHT: 69 IN

## 2020-02-11 DIAGNOSIS — D18.0 HEMANGIOMA: ICD-10-CM

## 2020-02-11 DIAGNOSIS — L57.0 ACTINIC KERATOSIS: ICD-10-CM

## 2020-02-11 DIAGNOSIS — D22 MELANOCYTIC NEVI: ICD-10-CM

## 2020-02-11 DIAGNOSIS — Z85.828 PERSONAL HISTORY OF OTHER MALIGNANT NEOPLASM OF SKIN: ICD-10-CM

## 2020-02-11 DIAGNOSIS — L81.4 OTHER MELANIN HYPERPIGMENTATION: ICD-10-CM

## 2020-02-11 PROBLEM — D22.5 MELANOCYTIC NEVI OF TRUNK: Status: ACTIVE | Noted: 2020-02-11

## 2020-02-11 PROBLEM — D22.62 MELANOCYTIC NEVI OF LEFT UPPER LIMB, INCLUDING SHOULDER: Status: ACTIVE | Noted: 2020-02-11

## 2020-02-11 PROBLEM — D22.72 MELANOCYTIC NEVI OF LEFT LOWER LIMB, INCLUDING HIP: Status: ACTIVE | Noted: 2020-02-11

## 2020-02-11 PROBLEM — D22.61 MELANOCYTIC NEVI OF RIGHT UPPER LIMB, INCLUDING SHOULDER: Status: ACTIVE | Noted: 2020-02-11

## 2020-02-11 PROBLEM — D18.01 HEMANGIOMA OF SKIN AND SUBCUTANEOUS TISSUE: Status: ACTIVE | Noted: 2020-02-11

## 2020-02-11 PROBLEM — D22.71 MELANOCYTIC NEVI OF RIGHT LOWER LIMB, INCLUDING HIP: Status: ACTIVE | Noted: 2020-02-11

## 2020-02-11 PROCEDURE — ? COUNSELING

## 2020-02-11 PROCEDURE — ? CHEMICAL PEEL (PREMALIGNANT DESTRUCTION)

## 2020-02-11 PROCEDURE — 99214 OFFICE O/P EST MOD 30 MIN: CPT

## 2020-02-11 PROCEDURE — 17004 DESTROY PREMAL LESIONS 15/>: CPT

## 2020-02-11 PROCEDURE — ? DEFER

## 2020-02-11 ASSESSMENT — LOCATION SIMPLE DESCRIPTION DERM
LOCATION SIMPLE: LEFT UPPER ARM
LOCATION SIMPLE: UPPER BACK
LOCATION SIMPLE: RIGHT CLAVICULAR SKIN
LOCATION SIMPLE: RIGHT UPPER ARM
LOCATION SIMPLE: RIGHT POSTERIOR UPPER ARM
LOCATION SIMPLE: RIGHT THIGH
LOCATION SIMPLE: ABDOMEN
LOCATION SIMPLE: LEFT UPPER BACK
LOCATION SIMPLE: LEFT FOREHEAD
LOCATION SIMPLE: LEFT HAND
LOCATION SIMPLE: CHEST
LOCATION SIMPLE: CHEST
LOCATION SIMPLE: LEFT THIGH
LOCATION SIMPLE: LEFT POSTERIOR UPPER ARM
LOCATION SIMPLE: RIGHT HAND

## 2020-02-11 ASSESSMENT — LOCATION DETAILED DESCRIPTION DERM
LOCATION DETAILED: LEFT ANTERIOR PROXIMAL UPPER ARM
LOCATION DETAILED: LEFT LATERAL SUPERIOR CHEST
LOCATION DETAILED: LEFT ANTERIOR DISTAL THIGH
LOCATION DETAILED: RIGHT RADIAL DORSAL HAND
LOCATION DETAILED: SUPERIOR THORACIC SPINE
LOCATION DETAILED: LEFT MEDIAL SUPERIOR CHEST
LOCATION DETAILED: RIGHT ANTERIOR DISTAL UPPER ARM
LOCATION DETAILED: RIGHT CLAVICULAR SKIN
LOCATION DETAILED: RIGHT DISTAL POSTERIOR UPPER ARM
LOCATION DETAILED: LEFT MEDIAL UPPER BACK
LOCATION DETAILED: LEFT INFERIOR MEDIAL FOREHEAD
LOCATION DETAILED: LEFT ANTERIOR PROXIMAL THIGH
LOCATION DETAILED: LEFT ANTERIOR DISTAL UPPER ARM
LOCATION DETAILED: MIDDLE STERNUM
LOCATION DETAILED: INFERIOR THORACIC SPINE
LOCATION DETAILED: RIGHT ANTERIOR PROXIMAL UPPER ARM
LOCATION DETAILED: RIGHT ANTERIOR DISTAL THIGH
LOCATION DETAILED: LOWER STERNUM
LOCATION DETAILED: 3RD WEB SPACE LEFT HAND
LOCATION DETAILED: EPIGASTRIC SKIN
LOCATION DETAILED: STERNAL NOTCH
LOCATION DETAILED: RIGHT ANTERIOR PROXIMAL THIGH
LOCATION DETAILED: LEFT DISTAL POSTERIOR UPPER ARM

## 2020-02-11 ASSESSMENT — LOCATION ZONE DERM
LOCATION ZONE: HAND
LOCATION ZONE: FACE
LOCATION ZONE: LEG
LOCATION ZONE: ARM
LOCATION ZONE: TRUNK
LOCATION ZONE: TRUNK

## 2020-02-11 ASSESSMENT — PAIN INTENSITY VAS: HOW INTENSE IS YOUR PAIN 0 BEING NO PAIN, 10 BEING THE MOST SEVERE PAIN POSSIBLE?: 4/10 PAIN

## 2020-02-11 ASSESSMENT — TOTAL NUMBER OF LESIONS: # OF LESIONS?: 15

## 2020-02-11 NOTE — PROCEDURE: REASSURANCE
Include Location In Plan?: Yes
Hide Include Location In Plan Question?: No
Detail Level: Zone
Additional Note: Reassured benign in nature
Detail Level: Simple

## 2020-02-11 NOTE — PROCEDURE: MIPS QUALITY
Quality 226: Preventive Care And Screening: Tobacco Use: Screening And Cessation Intervention: Patient screened for tobacco use and is an ex/non-smoker
Quality 130: Documentation Of Current Medications In The Medical Record: Current Medications Documented
Quality 110: Preventive Care And Screening: Influenza Immunization: Influenza Immunization Administered during Influenza season
Quality 128: Preventive Care And Screening: Body Mass Index (Bmi) Screening And Follow-Up Plan: BMI documented as out of range, follow up plan not documented, reason not otherwise specified.
Detail Level: Generalized

## 2020-02-11 NOTE — PROCEDURE: CHEMICAL PEEL (PREMALIGNANT DESTRUCTION)
Endpoint: medium frosting
Treatment Number: 15
Consent: Prior to the procedure, written consent was obtained and risks were reviewed, including but not limited to: redness, peeling, blistering, pigmentary change, scarring, infection, and pain.
Detail Level: Zone
Post-Care Instructions: I reviewed with the patient in detail post-care instructions. Patient should avoid sun exposure and wear sun protection.
Chemical Peel: TCA 30%

## 2020-02-11 NOTE — PROCEDURE: DEFER
Detail Level: Zone
Procedure To Be Performed At Next Visit: Chemical Peel TCA
Introduction Text (Please End With A Colon): Patient will return to clinic for: TCA peel of the chest

## 2020-02-25 ENCOUNTER — APPOINTMENT (RX ONLY)
Dept: URBAN - METROPOLITAN AREA CLINIC 114 | Facility: CLINIC | Age: 65
Setting detail: DERMATOLOGY
End: 2020-02-25

## 2020-02-25 DIAGNOSIS — L57.0 ACTINIC KERATOSIS: ICD-10-CM

## 2020-02-25 PROCEDURE — 17004 DESTROY PREMAL LESIONS 15/>: CPT

## 2020-02-25 PROCEDURE — ? CHEMICAL PEEL (PREMALIGNANT DESTRUCTION)

## 2020-02-25 ASSESSMENT — TOTAL NUMBER OF LESIONS: # OF LESIONS?: 15

## 2020-02-25 ASSESSMENT — LOCATION DETAILED DESCRIPTION DERM
LOCATION DETAILED: LEFT LATERAL SUPERIOR CHEST
LOCATION DETAILED: STERNAL NOTCH
LOCATION DETAILED: RIGHT CLAVICULAR SKIN

## 2020-02-25 ASSESSMENT — LOCATION SIMPLE DESCRIPTION DERM
LOCATION SIMPLE: RIGHT CLAVICULAR SKIN
LOCATION SIMPLE: CHEST

## 2020-02-25 ASSESSMENT — PAIN INTENSITY VAS: HOW INTENSE IS YOUR PAIN 0 BEING NO PAIN, 10 BEING THE MOST SEVERE PAIN POSSIBLE?: 4/10 PAIN

## 2020-02-25 ASSESSMENT — LOCATION ZONE DERM: LOCATION ZONE: TRUNK

## 2020-02-25 NOTE — PROCEDURE: CHEMICAL PEEL (PREMALIGNANT DESTRUCTION)
Total Number Of Aks Treated: 15
Detail Level: Zone
Post-Care Instructions: I reviewed with the patient in detail post-care instructions. Patient should avoid sun exposure and wear sun protection.
Chemical Peel: TCA 30%
Consent: Prior to the procedure, written consent was obtained and risks were reviewed, including but not limited to: redness, peeling, blistering, pigmentary change, scarring, infection, and pain.
Endpoint: medium frosting

## 2022-03-10 ENCOUNTER — APPOINTMENT (RX ONLY)
Dept: URBAN - METROPOLITAN AREA CLINIC 114 | Facility: CLINIC | Age: 67
Setting detail: DERMATOLOGY
End: 2022-03-10

## 2022-03-10 VITALS — HEIGHT: 70.5 IN | WEIGHT: 200 LBS

## 2022-03-10 DIAGNOSIS — D22 MELANOCYTIC NEVI: ICD-10-CM

## 2022-03-10 DIAGNOSIS — L81.4 OTHER MELANIN HYPERPIGMENTATION: ICD-10-CM

## 2022-03-10 DIAGNOSIS — L85.3 XEROSIS CUTIS: ICD-10-CM

## 2022-03-10 DIAGNOSIS — D18.0 HEMANGIOMA: ICD-10-CM

## 2022-03-10 DIAGNOSIS — L82.1 OTHER SEBORRHEIC KERATOSIS: ICD-10-CM

## 2022-03-10 DIAGNOSIS — L57.8 OTHER SKIN CHANGES DUE TO CHRONIC EXPOSURE TO NONIONIZING RADIATION: ICD-10-CM

## 2022-03-10 DIAGNOSIS — Z85.828 PERSONAL HISTORY OF OTHER MALIGNANT NEOPLASM OF SKIN: ICD-10-CM

## 2022-03-10 PROBLEM — E03.9 HYPOTHYROIDISM, UNSPECIFIED: Status: ACTIVE | Noted: 2022-03-10

## 2022-03-10 PROBLEM — D48.5 NEOPLASM OF UNCERTAIN BEHAVIOR OF SKIN: Status: ACTIVE | Noted: 2022-03-10

## 2022-03-10 PROBLEM — D22.71 MELANOCYTIC NEVI OF RIGHT LOWER LIMB, INCLUDING HIP: Status: ACTIVE | Noted: 2022-03-10

## 2022-03-10 PROBLEM — D22.5 MELANOCYTIC NEVI OF TRUNK: Status: ACTIVE | Noted: 2022-03-10

## 2022-03-10 PROBLEM — D18.01 HEMANGIOMA OF SKIN AND SUBCUTANEOUS TISSUE: Status: ACTIVE | Noted: 2022-03-10

## 2022-03-10 PROBLEM — D22.61 MELANOCYTIC NEVI OF RIGHT UPPER LIMB, INCLUDING SHOULDER: Status: ACTIVE | Noted: 2022-03-10

## 2022-03-10 PROBLEM — D22.72 MELANOCYTIC NEVI OF LEFT LOWER LIMB, INCLUDING HIP: Status: ACTIVE | Noted: 2022-03-10

## 2022-03-10 PROBLEM — D22.62 MELANOCYTIC NEVI OF LEFT UPPER LIMB, INCLUDING SHOULDER: Status: ACTIVE | Noted: 2022-03-10

## 2022-03-10 PROCEDURE — ? BIOPSY BY SHAVE METHOD

## 2022-03-10 PROCEDURE — 99214 OFFICE O/P EST MOD 30 MIN: CPT | Mod: 25

## 2022-03-10 PROCEDURE — ? TREATMENT REGIMEN

## 2022-03-10 PROCEDURE — ? SUNSCREEN RECOMMENDATIONS

## 2022-03-10 PROCEDURE — ? COUNSELING

## 2022-03-10 PROCEDURE — 11102 TANGNTL BX SKIN SINGLE LES: CPT

## 2022-03-10 PROCEDURE — ? PRESCRIPTION SAMPLES PROVIDED

## 2022-03-10 PROCEDURE — 11103 TANGNTL BX SKIN EA SEP/ADDL: CPT

## 2022-03-10 ASSESSMENT — LOCATION DETAILED DESCRIPTION DERM
LOCATION DETAILED: RIGHT MEDIAL SUPERIOR CHEST
LOCATION DETAILED: LEFT PROXIMAL POSTERIOR UPPER ARM
LOCATION DETAILED: RIGHT ANTERIOR DISTAL UPPER ARM
LOCATION DETAILED: SUPERIOR THORACIC SPINE
LOCATION DETAILED: LEFT ANTERIOR PROXIMAL THIGH
LOCATION DETAILED: RIGHT PROXIMAL POSTERIOR UPPER ARM
LOCATION DETAILED: LEFT RADIAL DORSAL HAND
LOCATION DETAILED: LEFT PROXIMAL PRETIBIAL REGION
LOCATION DETAILED: RIGHT ANTERIOR PROXIMAL UPPER ARM
LOCATION DETAILED: RIGHT VENTRAL PROXIMAL FOREARM
LOCATION DETAILED: LEFT ANTERIOR DISTAL UPPER ARM
LOCATION DETAILED: LEFT MEDIAL BREAST 10-11:00 REGION
LOCATION DETAILED: RIGHT ANTERIOR PROXIMAL THIGH
LOCATION DETAILED: RIGHT ANTERIOR DISTAL THIGH
LOCATION DETAILED: EPIGASTRIC SKIN
LOCATION DETAILED: LEFT VENTRAL PROXIMAL FOREARM
LOCATION DETAILED: RIGHT PROXIMAL PRETIBIAL REGION
LOCATION DETAILED: LEFT SUPERIOR MEDIAL UPPER BACK
LOCATION DETAILED: LEFT ANTERIOR DISTAL THIGH
LOCATION DETAILED: SUBXIPHOID
LOCATION DETAILED: INFERIOR THORACIC SPINE
LOCATION DETAILED: LEFT MID-UPPER BACK
LOCATION DETAILED: LEFT DISTAL PRETIBIAL REGION
LOCATION DETAILED: MIDDLE STERNUM
LOCATION DETAILED: HAIR
LOCATION DETAILED: RIGHT RADIAL DORSAL HAND
LOCATION DETAILED: RIGHT DISTAL PRETIBIAL REGION
LOCATION DETAILED: LEFT ANTERIOR PROXIMAL UPPER ARM
LOCATION DETAILED: RIGHT SUPERIOR UPPER BACK
LOCATION DETAILED: UPPER STERNUM

## 2022-03-10 ASSESSMENT — LOCATION SIMPLE DESCRIPTION DERM
LOCATION SIMPLE: LEFT UPPER BACK
LOCATION SIMPLE: UPPER BACK
LOCATION SIMPLE: CHEST
LOCATION SIMPLE: RIGHT THIGH
LOCATION SIMPLE: RIGHT POSTERIOR UPPER ARM
LOCATION SIMPLE: LEFT BREAST
LOCATION SIMPLE: RIGHT PRETIBIAL REGION
LOCATION SIMPLE: RIGHT UPPER ARM
LOCATION SIMPLE: RIGHT UPPER BACK
LOCATION SIMPLE: LEFT HAND
LOCATION SIMPLE: LEFT POSTERIOR UPPER ARM
LOCATION SIMPLE: LEFT PRETIBIAL REGION
LOCATION SIMPLE: LEFT THIGH
LOCATION SIMPLE: ABDOMEN
LOCATION SIMPLE: LEFT UPPER ARM
LOCATION SIMPLE: RIGHT FOREARM
LOCATION SIMPLE: RIGHT HAND
LOCATION SIMPLE: LEFT FOREARM
LOCATION SIMPLE: HAIR

## 2022-03-10 ASSESSMENT — LOCATION ZONE DERM
LOCATION ZONE: HAND
LOCATION ZONE: LEG
LOCATION ZONE: TRUNK
LOCATION ZONE: SCALP
LOCATION ZONE: ARM

## 2022-03-10 NOTE — PROCEDURE: REASSURANCE
Hide Additional Notes?: No
Additional Note: Reassured benign in nature.
Detail Level: Zone
Detail Level: Simple

## 2022-03-10 NOTE — PROCEDURE: MIPS QUALITY
Quality 130: Documentation Of Current Medications In The Medical Record: Current Medications Documented
Quality 128: Preventive Care And Screening: Body Mass Index (Bmi) Screening And Follow-Up Plan: BMI documented as out of range, follow up plan not documented, reason not otherwise specified.
Quality 226: Preventive Care And Screening: Tobacco Use: Screening And Cessation Intervention: Patient screened for tobacco use and is an ex/non-smoker
Detail Level: Zone
Quality 110: Preventive Care And Screening: Influenza Immunization: Influenza Immunization not Administered because Patient Refused.

## 2022-03-10 NOTE — PROCEDURE: BIOPSY BY SHAVE METHOD
X Size Of Lesion In Cm: 0
Biopsy Type: H and E
Electrodesiccation And Curettage Text: The wound bed was treated with electrodesiccation and curettage after the biopsy was performed.
Render Path Notes In Note?: Yes
Bill 35483 For Specimen Handling/Conveyance To Laboratory?: no
Anesthesia Volume In Cc: 0.9
Cryotherapy Text: The wound bed was treated with cryotherapy after the biopsy was performed.
Consent: Verbal consent was obtained and risks were reviewed including but not limited to scarring, infection, bleeding, scabbing, incomplete removal, nerve damage and allergy to anesthesia.
Depth Of Biopsy: dermis
Post-Care Instructions: I reviewed with the patient in detail post-care instructions: If your area was covered with a Band-Aid, remove with your next bath and gently clean daily with soap and water. Do not use hydrogen peroxide. Apply a thin layer of Aquaphor/Vaseline and cover with a Band-Aid. The goal is to keep the wound moist to prevent a scab. It is normal to have a little redness 1/8 inch around the area; however, if increased redness, pain, swelling or discolored drainage occurs, call the office. If bleeding occurs, hold steady pressure for 10-15 minutes to stop it. Our office normally receives your results in 7-10 business days and will call or email you when we receive them. If you do not hear from us in two weeks, please call us.
Hemostasis: Aluminum Chloride
Biopsy Method: Dermablade
Anesthesia Type: 1% lidocaine with epinephrine and a 1:10 solution of 8.4% sodium bicarbonate
Silver Nitrate Text: The wound bed was treated with silver nitrate after the biopsy was performed.
Electrodesiccation Text: The wound bed was treated with electrodesiccation after the biopsy was performed.
Detail Level: Detailed
Size Of Lesion In Cm: 0.3
Notification Instructions: Patient will be notified of biopsy results. However, patient instructed to call the office if not contacted within 2 weeks.
Dressing: Band-Aid
Curettage Text: The wound bed was treated with curettage after the biopsy was performed.
Type Of Destruction Used: Electrodesiccation and Curettage
Wound Care: Polysporin ointment
Information: Selecting Yes will display possible errors in your note based on the variables you have selected. This validation is only offered as a suggestion for you. PLEASE NOTE THAT THE VALIDATION TEXT WILL BE REMOVED WHEN YOU FINALIZE YOUR NOTE. IF YOU WANT TO FAX A PRELIMINARY NOTE YOU WILL NEED TO TOGGLE THIS TO 'NO' IF YOU DO NOT WANT IT IN YOUR FAXED NOTE.
Size Of Lesion In Cm: 0.4
Billing Type: Third-Party Bill

## 2022-03-10 NOTE — PROCEDURE: COUNSELING
Detail Level: Detailed
Detail Level: Simple
Detail Level: Zone
Sunscreen Recommendations: Every 6 months.

## 2022-04-04 ENCOUNTER — APPOINTMENT (RX ONLY)
Dept: URBAN - METROPOLITAN AREA CLINIC 114 | Facility: CLINIC | Age: 67
Setting detail: DERMATOLOGY
End: 2022-04-04

## 2022-04-04 PROBLEM — C44.529 SQUAMOUS CELL CARCINOMA OF SKIN OF OTHER PART OF TRUNK: Status: ACTIVE | Noted: 2022-04-04

## 2022-04-04 PROCEDURE — 11602 EXC TR-EXT MAL+MARG 1.1-2 CM: CPT

## 2022-04-04 PROCEDURE — ? COUNSELING

## 2022-04-04 PROCEDURE — ? EXCISION

## 2022-04-04 PROCEDURE — 13100 CMPLX RPR TRUNK 1.1-2.5 CM: CPT

## 2022-04-04 NOTE — PROCEDURE: EXCISION
Positioning (Leave Blank If You Do Not Want): The patient was placed in a comfortable position exposing the surgical site.
Estimated Blood Loss (Cc): minimal
Show Primary And Secondary Defect Sizes Variable (Do Not Hide If You Perform Flaps Or Graft Closures): Yes
Render The Repair Note As A Separate Paragraph: No
Anesthesia Volume In Cc: 0
Complex Repair And Modified Advancement Flap Text: The defect edges were debeveled with a #15 scalpel blade.  The primary defect was closed partially with a complex linear closure.  Given the location of the remaining defect, shape of the defect and the proximity to free margins a modified advancement flap was deemed most appropriate for complete closure of the defect.  Using a sterile surgical marker, an appropriate advancement flap was drawn incorporating the defect and placing the expected incisions within the relaxed skin tension lines where possible.    The area thus outlined was incised deep to adipose tissue with a #15 scalpel blade.  The skin margins were undermined to an appropriate distance in all directions utilizing iris scissors.
Complex Repair And Rhombic Flap Text: The defect edges were debeveled with a #15 scalpel blade.  The primary defect was closed partially with a complex linear closure.  Given the location of the remaining defect, shape of the defect and the proximity to free margins a rhombic flap was deemed most appropriate for complete closure of the defect.  Using a sterile surgical marker, an appropriate advancement flap was drawn incorporating the defect and placing the expected incisions within the relaxed skin tension lines where possible.    The area thus outlined was incised deep to adipose tissue with a #15 scalpel blade.  The skin margins were undermined to an appropriate distance in all directions utilizing iris scissors.
Complex Repair And Dorsal Nasal Flap Text: The defect edges were debeveled with a #15 scalpel blade.  The primary defect was closed partially with a complex linear closure.  Given the location of the remaining defect, shape of the defect and the proximity to free margins a dorsal nasal flap was deemed most appropriate for complete closure of the defect.  Using a sterile surgical marker, an appropriate flap was drawn incorporating the defect and placing the expected incisions within the relaxed skin tension lines where possible.    The area thus outlined was incised deep to adipose tissue with a #15 scalpel blade.  The skin margins were undermined to an appropriate distance in all directions utilizing iris scissors.
Complex Repair And Xenograft Text: The defect edges were debeveled with a #15 scalpel blade.  The primary defect was closed partially with a complex linear closure.  Given the location of the defect, shape of the defect and the proximity to free margins a xenograft was deemed most appropriate to repair the remaining defect.  The graft was trimmed to fit the size of the remaining defect.  The graft was then placed in the primary defect, oriented appropriately, and sutured into place.
Debridement Text: The wound edges were debrided prior to proceeding with the closure to facilitate wound healing.
Xenograft Text: The defect edges were debeveled with a #15 scalpel blade.  Given the location of the defect, shape of the defect and the proximity to free margins a xenograft was deemed most appropriate.  The graft was then trimmed to fit the size of the defect.  The graft was then placed in the primary defect and oriented appropriately.
Cheek-To-Nose Interpolation Flap Text: A decision was made to reconstruct the defect utilizing an interpolation axial flap and a staged reconstruction.  A telfa template was made of the defect.  This telfa template was then used to outline the Cheek-To-Nose Interpolation flap.  The donor area for the pedicle flap was then injected with anesthesia.  The flap was excised through the skin and subcutaneous tissue down to the layer of the underlying musculature.  The interpolation flap was carefully excised within this deep plane to maintain its blood supply.  The edges of the donor site were undermined.   The donor site was closed in a primary fashion.  The pedicle was then rotated into position and sutured.  Once the tube was sutured into place, adequate blood supply was confirmed with blanching and refill.  The pedicle was then wrapped with xeroform gauze and dressed appropriately with a telfa and gauze bandage to ensure continued blood supply and protect the attached pedicle.
Ftsg Text: The defect edges were debeveled with a #15 scalpel blade.  Given the location of the defect, shape of the defect and the proximity to free margins a full thickness skin graft was deemed most appropriate.  Using a sterile surgical marker, the primary defect shape was transferred to the donor site. The area thus outlined was incised deep to adipose tissue with a #15 scalpel blade.  The harvested graft was then trimmed of adipose tissue until only dermis and epidermis was left.  The skin margins of the secondary defect were undermined to an appropriate distance in all directions utilizing iris scissors.  The secondary defect was closed with interrupted buried subcutaneous sutures.  The skin edges were then re-apposed with running  sutures.  The skin graft was then placed in the primary defect and oriented appropriately.
Skin Substitute Text: The defect edges were debeveled with a #15 scalpel blade.  Given the location of the defect, shape of the defect and the proximity to free margins a skin substitute graft was deemed most appropriate.  The graft material was trimmed to fit the size of the defect. The graft was then placed in the primary defect and oriented appropriately.
Helical Rim Text: The closure involved the helical rim.
Consent was obtained from the patient. The risks and benefits to therapy were discussed in detail. Specifically, the risks of infection, scarring, bleeding, prolonged wound healing, incomplete removal, allergy to anesthesia, nerve injury and recurrence were addressed. Prior to the procedure, the treatment site was clearly identified and confirmed by the patient. All components of Universal Protocol/PAUSE Rule completed.
Ear Star Wedge Flap Text: The defect edges were debeveled with a #15 blade scalpel.  Given the location of the defect and the proximity to free margins (helical rim) an ear star wedge flap was deemed most appropriate.  Using a sterile surgical marker, the appropriate flap was drawn incorporating the defect and placing the expected incisions between the helical rim and antihelix where possible.  The area thus outlined was incised through and through with a #15 scalpel blade.
Island Pedicle Flap With Canthal Suspension Text: The defect edges were debeveled with a #15 scalpel blade.  Given the location of the defect, shape of the defect and the proximity to free margins an island pedicle advancement flap was deemed most appropriate.  Using a sterile surgical marker, an appropriate advancement flap was drawn incorporating the defect, outlining the appropriate donor tissue and placing the expected incisions within the relaxed skin tension lines where possible. The area thus outlined was incised deep to adipose tissue with a #15 scalpel blade.  The skin margins were undermined to an appropriate distance in all directions around the primary defect and laterally outward around the island pedicle utilizing iris scissors.  There was minimal undermining beneath the pedicle flap. A suspension suture was placed in the canthal tendon to prevent tension and prevent ectropion.
Curvilinear Excision Additional Text (Leave Blank If You Do Not Want): The margin was drawn around the clinically apparent lesion.  A curvilinear shape was then drawn on the skin incorporating the lesion and margins.  Incisions were then made along these lines to the appropriate tissue plane and the lesion was extirpated.
Double O-Z Plasty Text: The defect edges were debeveled with a #15 scalpel blade.  Given the location of the defect, shape of the defect and the proximity to free margins a Double O-Z plasty (double transposition flap) was deemed most appropriate.  Using a sterile surgical marker, the appropriate transposition flaps were drawn incorporating the defect and placing the expected incisions within the relaxed skin tension lines where possible. The area thus outlined was incised deep to adipose tissue with a #15 scalpel blade.  The skin margins were undermined to an appropriate distance in all directions utilizing iris scissors.  Hemostasis was achieved with electrocautery.  The flaps were then transposed into place, one clockwise and the other counterclockwise, and anchored with interrupted buried subcutaneous sutures.
Wound Check: 7 days
Advancement Flap (Single) Text: The defect edges were debeveled with a #15 scalpel blade.  Given the location of the defect and the proximity to free margins a single advancement flap was deemed most appropriate.  Using a sterile surgical marker, an appropriate advancement flap was drawn incorporating the defect and placing the expected incisions within the relaxed skin tension lines where possible.    The area thus outlined was incised deep to adipose tissue with a #15 scalpel blade.  The skin margins were undermined to an appropriate distance in all directions utilizing iris scissors.
O-L Flap Text: The defect edges were debeveled with a #15 scalpel blade.  Given the location of the defect, shape of the defect and the proximity to free margins an O-L flap was deemed most appropriate.  Using a sterile surgical marker, an appropriate advancement flap was drawn incorporating the defect and placing the expected incisions within the relaxed skin tension lines where possible.    The area thus outlined was incised deep to adipose tissue with a #15 scalpel blade.  The skin margins were undermined to an appropriate distance in all directions utilizing iris scissors.
Mucosal Advancement Flap Text: Given the location of the defect, shape of the defect and the proximity to free margins a mucosal advancement flap was deemed most appropriate. Incisions were made with a 15 blade scalpel in the appropriate fashion along the cutaneous vermilion border and the mucosal lip. The remaining actinically damaged mucosal tissue was excised.  The mucosal advancement flap was then elevated to the gingival sulcus with care taken to preserve the neurovascular structures and advanced into the primary defect. Care was taken to ensure that precise realignment of the vermilion border was achieved.
Transposition Flap Text: The defect edges were debeveled with a #15 scalpel blade.  Given the location of the defect and the proximity to free margins a transposition flap was deemed most appropriate.  Using a sterile surgical marker, an appropriate transposition flap was drawn incorporating the defect.    The area thus outlined was incised deep to adipose tissue with a #15 scalpel blade.  The skin margins were undermined to an appropriate distance in all directions utilizing iris scissors.
Post-Care Instructions: I reviewed with the patient in detail post-care instructions. Patient is not to engage in any heavy lifting, exercise, or swimming for the next 14 days. Should the patient develop any fevers, chills, bleeding, severe pain patient will contact the office immediately.
Intermediate Repair Preamble Text (Leave Blank If You Do Not Want): Undermining was performed with blunt dissection.
Slit Excision Additional Text (Leave Blank If You Do Not Want): A linear line was drawn on the skin overlying the lesion. An incision was made slowly until the lesion was visualized.  Once visualized, the lesion was removed with blunt dissection.
Complex Repair And O-L Flap Text: The defect edges were debeveled with a #15 scalpel blade.  The primary defect was closed partially with a complex linear closure.  Given the location of the remaining defect, shape of the defect and the proximity to free margins an O-L flap was deemed most appropriate for complete closure of the defect.  Using a sterile surgical marker, an appropriate flap was drawn incorporating the defect and placing the expected incisions within the relaxed skin tension lines where possible.    The area thus outlined was incised deep to adipose tissue with a #15 scalpel blade.  The skin margins were undermined to an appropriate distance in all directions utilizing iris scissors.
Complex Repair And M Plasty Text: The defect edges were debeveled with a #15 scalpel blade.  The primary defect was closed partially with a complex linear closure.  Given the location of the remaining defect, shape of the defect and the proximity to free margins an M plasty was deemed most appropriate for complete closure of the defect.  Using a sterile surgical marker, an appropriate advancement flap was drawn incorporating the defect and placing the expected incisions within the relaxed skin tension lines where possible.    The area thus outlined was incised deep to adipose tissue with a #15 scalpel blade.  The skin margins were undermined to an appropriate distance in all directions utilizing iris scissors.
Complex Repair And Split-Thickness Skin Graft Text: The defect edges were debeveled with a #15 scalpel blade.  The primary defect was closed partially with a complex linear closure.  Given the location of the defect, shape of the defect and the proximity to free margins a split thickness skin graft was deemed most appropriate to repair the remaining defect.  The graft was trimmed to fit the size of the remaining defect.  The graft was then placed in the primary defect, oriented appropriately, and sutured into place.
Suturegard Body: The suture ends were repeatedly re-tightened and re-clamped to achieve the desired tissue expansion.
Graft Donor Site Bandage (Optional-Leave Blank If You Don't Want In Note): Steri-strips and a pressure bandage were applied to the donor site.
Partial Purse String (Intermediate) Text: Given the location of the defect and the characteristics of the surrounding skin an intermediate purse string closure was deemed most appropriate.  Undermining was performed circumferentially around the surgical defect.  A purse string suture was then placed and tightened. Wound tension of the circular defect prevented complete closure of the wound.
Repair Type: Complex
Retention Suture Bite Size: 3 mm
Hemigard Retention Suture: 2-0 Nylon
Excision Method: Elliptical
Mastoid Interpolation Flap Text: A decision was made to reconstruct the defect utilizing an interpolation axial flap and a staged reconstruction.  A telfa template was made of the defect.  This telfa template was then used to outline the mastoid interpolation flap.  The donor area for the pedicle flap was then injected with anesthesia.  The flap was excised through the skin and subcutaneous tissue down to the layer of the underlying musculature.  The pedicle flap was carefully excised within this deep plane to maintain its blood supply.  The edges of the donor site were undermined.   The donor site was closed in a primary fashion.  The pedicle was then rotated into position and sutured.  Once the tube was sutured into place, adequate blood supply was confirmed with blanching and refill.  The pedicle was then wrapped with xeroform gauze and dressed appropriately with a telfa and gauze bandage to ensure continued blood supply and protect the attached pedicle.
Cartilage Graft Text: The defect edges were debeveled with a #15 scalpel blade.  Given the location of the defect, shape of the defect, the fact the defect involved a full thickness cartilage defect a cartilage graft was deemed most appropriate.  An appropriate donor site was identified, cleansed, and anesthetized. The cartilage graft was then harvested and transferred to the recipient site, oriented appropriately and then sutured into place.  The secondary defect was then repaired using a primary closure.
Epidermal Closure Graft Donor Site (Optional): simple interrupted
Bilobed Transposition Flap Text: The defect edges were debeveled with a #15 scalpel blade.  Given the location of the defect and the proximity to free margins a bilobed transposition flap was deemed most appropriate.  Using a sterile surgical marker, an appropriate bilobe flap drawn around the defect.    The area thus outlined was incised deep to adipose tissue with a #15 scalpel blade.  The skin margins were undermined to an appropriate distance in all directions utilizing iris scissors.
Island Pedicle Flap-Requiring Vessel Identification Text: The defect edges were debeveled with a #15 scalpel blade.  Given the location of the defect, shape of the defect and the proximity to free margins an island pedicle advancement flap was deemed most appropriate.  Using a sterile surgical marker, an appropriate advancement flap was drawn, based on the axial vessel mentioned above, incorporating the defect, outlining the appropriate donor tissue and placing the expected incisions within the relaxed skin tension lines where possible.    The area thus outlined was incised deep to adipose tissue with a #15 scalpel blade.  The skin margins were undermined to an appropriate distance in all directions around the primary defect and laterally outward around the island pedicle utilizing iris scissors.  There was minimal undermining beneath the pedicle flap.
Width Of Defect Perpendicular To Closure In Cm (Required): 2
Saucerization Excision Additional Text (Leave Blank If You Do Not Want): The margin was drawn around the clinically apparent lesion.  Incisions were then made along these lines, in a tangential fashion, to the appropriate tissue plane and the lesion was extirpated.
W Plasty Text: The lesion was extirpated to the level of the fat with a #15 scalpel blade.  Given the location of the defect, shape of the defect and the proximity to free margins a W-plasty was deemed most appropriate for repair.  Using a sterile surgical marker, the appropriate transposition arms of the W-plasty were drawn incorporating the defect and placing the expected incisions within the relaxed skin tension lines where possible.    The area thus outlined was incised deep to adipose tissue with a #15 scalpel blade.  The skin margins were undermined to an appropriate distance in all directions utilizing iris scissors.  The opposing transposition arms were then transposed into place in opposite direction and anchored with interrupted buried subcutaneous sutures.
Dressing: pressure dressing
Vermilion Border Text: The closure involved the vermilion border.
Epidermal Sutures: 4-0 Nylon
V-Y Plasty Text: The defect edges were debeveled with a #15 scalpel blade.  Given the location of the defect, shape of the defect and the proximity to free margins an V-Y advancement flap was deemed most appropriate.  Using a sterile surgical marker, an appropriate advancement flap was drawn incorporating the defect and placing the expected incisions within the relaxed skin tension lines where possible.    The area thus outlined was incised deep to adipose tissue with a #15 scalpel blade.  The skin margins were undermined to an appropriate distance in all directions utilizing iris scissors.
Interpolation Flap Text: A decision was made to reconstruct the defect utilizing an interpolation axial flap and a staged reconstruction.  A telfa template was made of the defect.  This telfa template was then used to outline the interpolation flap.  The donor area for the pedicle flap was then injected with anesthesia.  The flap was excised through the skin and subcutaneous tissue down to the layer of the underlying musculature.  The interpolation flap was carefully excised within this deep plane to maintain its blood supply.  The edges of the donor site were undermined.   The donor site was closed in a primary fashion.  The pedicle was then rotated into position and sutured.  Once the tube was sutured into place, adequate blood supply was confirmed with blanching and refill.  The pedicle was then wrapped with xeroform gauze and dressed appropriately with a telfa and gauze bandage to ensure continued blood supply and protect the attached pedicle.
Fusiform Excision Additional Text (Leave Blank If You Do Not Want): The margin was drawn around the clinically apparent lesion.  A fusiform shape was then drawn on the skin incorporating the lesion and margins.  Incisions were then made along these lines to the appropriate tissue plane and the lesion was extirpated.
Split-Thickness Skin Graft Text: The defect edges were debeveled with a #15 scalpel blade.  Given the location of the defect, shape of the defect and the proximity to free margins a split thickness skin graft was deemed most appropriate.  Using a sterile surgical marker, the primary defect shape was transferred to the donor site. The split thickness graft was then harvested.  The skin graft was then placed in the primary defect and oriented appropriately.
Tissue Cultured Epidermal Autograft Text: The defect edges were debeveled with a #15 scalpel blade.  Given the location of the defect, shape of the defect and the proximity to free margins a tissue cultured epidermal autograft was deemed most appropriate.  The graft was then trimmed to fit the size of the defect.  The graft was then placed in the primary defect and oriented appropriately.
Melolabial Transposition Flap Text: The defect edges were debeveled with a #15 scalpel blade.  Given the location of the defect and the proximity to free margins a melolabial flap was deemed most appropriate.  Using a sterile surgical marker, an appropriate melolabial transposition flap was drawn incorporating the defect.    The area thus outlined was incised deep to adipose tissue with a #15 scalpel blade.  The skin margins were undermined to an appropriate distance in all directions utilizing iris scissors.
Banner Transposition Flap Text: The defect edges were debeveled with a #15 scalpel blade.  Given the location of the defect and the proximity to free margins a Banner transposition flap was deemed most appropriate.  Using a sterile surgical marker, an appropriate flap drawn around the defect. The area thus outlined was incised deep to adipose tissue with a #15 scalpel blade.  The skin margins were undermined to an appropriate distance in all directions utilizing iris scissors.
Alar Island Pedicle Flap Text: The defect edges were debeveled with a #15 scalpel blade.  Given the location of the defect, shape of the defect and the proximity to the alar rim an island pedicle advancement flap was deemed most appropriate.  Using a sterile surgical marker, an appropriate advancement flap was drawn incorporating the defect, outlining the appropriate donor tissue and placing the expected incisions within the nasal ala running parallel to the alar rim. The area thus outlined was incised with a #15 scalpel blade.  The skin margins were undermined minimally to an appropriate distance in all directions around the primary defect and laterally outward around the island pedicle utilizing iris scissors.  There was minimal undermining beneath the pedicle flap.
Muscle Hinge Flap Text: The defect edges were debeveled with a #15 scalpel blade.  Given the size, depth and location of the defect and the proximity to free margins a muscle hinge flap was deemed most appropriate.  Using a sterile surgical marker, an appropriate hinge flap was drawn incorporating the defect. The area thus outlined was incised with a #15 scalpel blade.  The skin margins were undermined to an appropriate distance in all directions utilizing iris scissors.
Home Suture Removal Text: Patient was provided a home suture removal kit and will remove their sutures at home.  If they have any questions or difficulties they will call the office.
Excisional Biopsy Additional Text (Leave Blank If You Do Not Want): The margin was drawn around the clinically apparent lesion. An elliptical shape was then drawn on the skin incorporating the lesion and margins.  Incisions were then made along these lines to the appropriate tissue plane and the lesion was extirpated.
Advancement Flap (Double) Text: The defect edges were debeveled with a #15 scalpel blade.  Given the location of the defect and the proximity to free margins a double advancement flap was deemed most appropriate.  Using a sterile surgical marker, the appropriate advancement flaps were drawn incorporating the defect and placing the expected incisions within the relaxed skin tension lines where possible.    The area thus outlined was incised deep to adipose tissue with a #15 scalpel blade.  The skin margins were undermined to an appropriate distance in all directions utilizing iris scissors.
O-Z Flap Text: The defect edges were debeveled with a #15 scalpel blade.  Given the location of the defect, shape of the defect and the proximity to free margins an O-Z flap was deemed most appropriate.  Using a sterile surgical marker, an appropriate transposition flap was drawn incorporating the defect and placing the expected incisions within the relaxed skin tension lines where possible. The area thus outlined was incised deep to adipose tissue with a #15 scalpel blade.  The skin margins were undermined to an appropriate distance in all directions utilizing iris scissors.
Peng Advancement Flap Text: The defect edges were debeveled with a #15 scalpel blade.  Given the location of the defect, shape of the defect and the proximity to free margins a Peng advancement flap was deemed most appropriate.  Using a sterile surgical marker, an appropriate advancement flap was drawn incorporating the defect and placing the expected incisions within the relaxed skin tension lines where possible. The area thus outlined was incised deep to adipose tissue with a #15 scalpel blade.  The skin margins were undermined to an appropriate distance in all directions utilizing iris scissors.
Hemigard Intro: Due to skin fragility and wound tension, it was decided to use HEMIGARD adhesive retention suture devices to permit a linear closure. The skin was cleaned and dried for a 6cm distance away from the wound. Excessive hair, if present, was removed to allow for adhesion.
Crescentic Complex Repair Preamble Text (Leave Blank If You Do Not Want): Extensive wide undermining was performed.
Partial Purse String (Simple) Text: Given the location of the defect and the characteristics of the surrounding skin a simple purse string closure was deemed most appropriate.  Undermining was performed circumferentially around the surgical defect.  A purse string suture was then placed and tightened. Wound tension of the circular defect prevented complete closure of the wound.
Billing Type: Third-Party Bill
Complex Repair And Bilobe Flap Text: The defect edges were debeveled with a #15 scalpel blade.  The primary defect was closed partially with a complex linear closure.  Given the location of the remaining defect, shape of the defect and the proximity to free margins a bilobe flap was deemed most appropriate for complete closure of the defect.  Using a sterile surgical marker, an appropriate advancement flap was drawn incorporating the defect and placing the expected incisions within the relaxed skin tension lines where possible.    The area thus outlined was incised deep to adipose tissue with a #15 scalpel blade.  The skin margins were undermined to an appropriate distance in all directions utilizing iris scissors.
Complex Repair And Double M Plasty Text: The defect edges were debeveled with a #15 scalpel blade.  The primary defect was closed partially with a complex linear closure.  Given the location of the remaining defect, shape of the defect and the proximity to free margins a double M plasty was deemed most appropriate for complete closure of the defect.  Using a sterile surgical marker, an appropriate advancement flap was drawn incorporating the defect and placing the expected incisions within the relaxed skin tension lines where possible.    The area thus outlined was incised deep to adipose tissue with a #15 scalpel blade.  The skin margins were undermined to an appropriate distance in all directions utilizing iris scissors.
Complex Repair And Epidermal Autograft Text: The defect edges were debeveled with a #15 scalpel blade.  The primary defect was closed partially with a complex linear closure.  Given the location of the defect, shape of the defect and the proximity to free margins an epidermal autograft was deemed most appropriate to repair the remaining defect.  The graft was trimmed to fit the size of the remaining defect.  The graft was then placed in the primary defect, oriented appropriately, and sutured into place.
Size Of Lesion In Cm: 1
Z Plasty Text: The lesion was extirpated to the level of the fat with a #15 scalpel blade.  Given the location of the defect, shape of the defect and the proximity to free margins a Z-plasty was deemed most appropriate for repair.  Using a sterile surgical marker, the appropriate transposition arms of the Z-plasty were drawn incorporating the defect and placing the expected incisions within the relaxed skin tension lines where possible.    The area thus outlined was incised deep to adipose tissue with a #15 scalpel blade.  The skin margins were undermined to an appropriate distance in all directions utilizing iris scissors.  The opposing transposition arms were then transposed into place in opposite direction and anchored with interrupted buried subcutaneous sutures.
Posterior Auricular Interpolation Flap Text: A decision was made to reconstruct the defect utilizing an interpolation axial flap and a staged reconstruction.  A telfa template was made of the defect.  This telfa template was then used to outline the posterior auricular interpolation flap.  The donor area for the pedicle flap was then injected with anesthesia.  The flap was excised through the skin and subcutaneous tissue down to the layer of the underlying musculature.  The pedicle flap was carefully excised within this deep plane to maintain its blood supply.  The edges of the donor site were undermined.   The donor site was closed in a primary fashion.  The pedicle was then rotated into position and sutured.  Once the tube was sutured into place, adequate blood supply was confirmed with blanching and refill.  The pedicle was then wrapped with xeroform gauze and dressed appropriately with a telfa and gauze bandage to ensure continued blood supply and protect the attached pedicle.
Composite Graft Text: The defect edges were debeveled with a #15 scalpel blade.  Given the location of the defect, shape of the defect, the proximity to free margins and the fact the defect was full thickness a composite graft was deemed most appropriate.  The defect was outline and then transferred to the donor site.  A full thickness graft was then excised from the donor site. The graft was then placed in the primary defect, oriented appropriately and then sutured into place.  The secondary defect was then repaired using a primary closure.
Path Notes (To The Dermatopathologist): Please check margins.
Anesthesia Type: 1% lidocaine with epinephrine and a 1:10 solution of 8.4% sodium bicarbonate
Bi-Rhombic Flap Text: The defect edges were debeveled with a #15 scalpel blade.  Given the location of the defect and the proximity to free margins a bi-rhombic flap was deemed most appropriate.  Using a sterile surgical marker, an appropriate rhombic flap was drawn incorporating the defect. The area thus outlined was incised deep to adipose tissue with a #15 scalpel blade.  The skin margins were undermined to an appropriate distance in all directions utilizing iris scissors.
Trilobed Flap Text: The defect edges were debeveled with a #15 scalpel blade.  Given the location of the defect and the proximity to free margins a trilobed flap was deemed most appropriate.  Using a sterile surgical marker, an appropriate trilobed flap drawn around the defect.    The area thus outlined was incised deep to adipose tissue with a #15 scalpel blade.  The skin margins were undermined to an appropriate distance in all directions utilizing iris scissors.
Keystone Flap Text: The defect edges were debeveled with a #15 scalpel blade.  Given the location of the defect, shape of the defect a keystone flap was deemed most appropriate.  Using a sterile surgical marker, an appropriate keystone flap was drawn incorporating the defect, outlining the appropriate donor tissue and placing the expected incisions within the relaxed skin tension lines where possible. The area thus outlined was incised deep to adipose tissue with a #15 scalpel blade.  The skin margins were undermined to an appropriate distance in all directions around the primary defect and laterally outward around the flap utilizing iris scissors.
Nasalis-Muscle-Based Myocutaneous Island Pedicle Flap Text: Using a #15 blade, an incision was made around the donor flap to the level of the nasalis muscle. Wide lateral undermining was then performed in both the subcutaneous plane above the nasalis muscle, and in a submuscular plane just above periosteum. This allowed the formation of a free nasalis muscle axial pedicle (based on the angular artery) which was still attached to the actual cutaneous flap, increasing its mobility and vascular viability. Hemostasis was obtained with pinpoint electrocoagulation. The flap was mobilized into position and the pivotal anchor points positioned and stabilized with buried interrupted sutures. Subcutaneous and dermal tissues were closed in a multilayered fashion with sutures. Tissue redundancies were excised, and the epidermal edges were apposed without significant tension and sutured with sutures.
Information: Selecting Yes will display possible errors in your note based on the variables you have selected. This validation is only offered as a suggestion for you. PLEASE NOTE THAT THE VALIDATION TEXT WILL BE REMOVED WHEN YOU FINALIZE YOUR NOTE. IF YOU WANT TO FAX A PRELIMINARY NOTE YOU WILL NEED TO TOGGLE THIS TO 'NO' IF YOU DO NOT WANT IT IN YOUR FAXED NOTE.
Repair Performed By Another Provider Text (Leave Blank If You Do Not Want): After the tissue was excised the defect was repaired by another provider.
Crescentic Advancement Flap Text: The defect edges were debeveled with a #15 scalpel blade.  Given the location of the defect and the proximity to free margins a crescentic advancement flap was deemed most appropriate.  Using a sterile surgical marker, the appropriate advancement flap was drawn incorporating the defect and placing the expected incisions within the relaxed skin tension lines where possible.    The area thus outlined was incised deep to adipose tissue with a #15 scalpel blade.  The skin margins were undermined to an appropriate distance in all directions utilizing iris scissors.
Advancement-Rotation Flap Text: The defect edges were debeveled with a #15 scalpel blade.  Given the location of the defect, shape of the defect and the proximity to free margins an advancement-rotation flap was deemed most appropriate.  Using a sterile surgical marker, an appropriate flap was drawn incorporating the defect and placing the expected incisions within the relaxed skin tension lines where possible. The area thus outlined was incised deep to adipose tissue with a #15 scalpel blade.  The skin margins were undermined to an appropriate distance in all directions utilizing iris scissors.
Spiral Flap Text: The defect edges were debeveled with a #15 scalpel blade.  Given the location of the defect, shape of the defect and the proximity to free margins a spiral flap was deemed most appropriate.  Using a sterile surgical marker, an appropriate rotation flap was drawn incorporating the defect and placing the expected incisions within the relaxed skin tension lines where possible. The area thus outlined was incised deep to adipose tissue with a #15 scalpel blade.  The skin margins were undermined to an appropriate distance in all directions utilizing iris scissors.
Burow's Graft Text: The defect edges were debeveled with a #15 scalpel blade.  Given the location of the defect, shape of the defect, the proximity to free margins and the presence of a standing cone deformity a Burow's skin graft was deemed most appropriate. The standing cone was removed and this tissue was then trimmed to the shape of the primary defect. The adipose tissue was also removed until only dermis and epidermis were left.  The skin margins of the secondary defect were undermined to an appropriate distance in all directions utilizing iris scissors.  The secondary defect was closed with interrupted buried subcutaneous sutures.  The skin edges were then re-apposed with running  sutures.  The skin graft was then placed in the primary defect and oriented appropriately.
Nostril Rim Text: The closure involved the nostril rim.
Length To Time In Minutes Device Was In Place: 10
H Plasty Text: Given the location of the defect, shape of the defect and the proximity to free margins a H-plasty was deemed most appropriate for repair.  Using a sterile surgical marker, the appropriate advancement arms of the H-plasty were drawn incorporating the defect and placing the expected incisions within the relaxed skin tension lines where possible. The area thus outlined was incised deep to adipose tissue with a #15 scalpel blade. The skin margins were undermined to an appropriate distance in all directions utilizing iris scissors.  The opposing advancement arms were then advanced into place in opposite direction and anchored with interrupted buried subcutaneous sutures.
Double Island Pedicle Flap Text: The defect edges were debeveled with a #15 scalpel blade.  Given the location of the defect, shape of the defect and the proximity to free margins a double island pedicle advancement flap was deemed most appropriate.  Using a sterile surgical marker, an appropriate advancement flap was drawn incorporating the defect, outlining the appropriate donor tissue and placing the expected incisions within the relaxed skin tension lines where possible.    The area thus outlined was incised deep to adipose tissue with a #15 scalpel blade.  The skin margins were undermined to an appropriate distance in all directions around the primary defect and laterally outward around the island pedicle utilizing iris scissors.  There was minimal undermining beneath the pedicle flap.
Elliptical Excision Additional Text (Leave Blank If You Do Not Want): The margin was drawn around the clinically apparent lesion.  An elliptical shape was then drawn on the skin incorporating the lesion and margins.  Incisions were then made along these lines to the appropriate tissue plane and the lesion was extirpated.
Melolabial Interpolation Flap Text: A decision was made to reconstruct the defect utilizing an interpolation axial flap and a staged reconstruction.  A telfa template was made of the defect.  This telfa template was then used to outline the melolabial interpolation flap.  The donor area for the pedicle flap was then injected with anesthesia.  The flap was excised through the skin and subcutaneous tissue down to the layer of the underlying musculature.  The pedicle flap was carefully excised within this deep plane to maintain its blood supply.  The edges of the donor site were undermined.   The donor site was closed in a primary fashion.  The pedicle was then rotated into position and sutured.  Once the tube was sutured into place, adequate blood supply was confirmed with blanching and refill.  The pedicle was then wrapped with xeroform gauze and dressed appropriately with a telfa and gauze bandage to ensure continued blood supply and protect the attached pedicle.
Rhombic Flap Text: The defect edges were debeveled with a #15 scalpel blade.  Given the location of the defect and the proximity to free margins a rhombic flap was deemed most appropriate.  Using a sterile surgical marker, an appropriate rhombic flap was drawn incorporating the defect.    The area thus outlined was incised deep to adipose tissue with a #15 scalpel blade.  The skin margins were undermined to an appropriate distance in all directions utilizing iris scissors.
Saucerization Depth: dermis and superficial adipose tissue
Bilobed Flap Text: The defect edges were debeveled with a #15 scalpel blade.  Given the location of the defect and the proximity to free margins a bilobe flap was deemed most appropriate.  Using a sterile surgical marker, an appropriate bilobe flap drawn around the defect.    The area thus outlined was incised deep to adipose tissue with a #15 scalpel blade.  The skin margins were undermined to an appropriate distance in all directions utilizing iris scissors.
Retention Suture Text: Retention sutures were placed to support the closure and prevent dehiscence.
Hatchet Flap Text: The defect edges were debeveled with a #15 scalpel blade.  Given the location of the defect, shape of the defect and the proximity to free margins a hatchet flap was deemed most appropriate.  Using a sterile surgical marker, an appropriate hatchet flap was drawn incorporating the defect and placing the expected incisions within the relaxed skin tension lines where possible.    The area thus outlined was incised deep to adipose tissue with a #15 scalpel blade.  The skin margins were undermined to an appropriate distance in all directions utilizing iris scissors.
Mustarde Flap Text: The defect edges were debeveled with a #15 scalpel blade.  Given the size, depth and location of the defect and the proximity to free margins a Mustarde flap was deemed most appropriate.  Using a sterile surgical marker, an appropriate flap was drawn incorporating the defect. The area thus outlined was incised with a #15 scalpel blade.  The skin margins were undermined to an appropriate distance in all directions utilizing iris scissors.
Perilesional Excision Additional Text (Leave Blank If You Do Not Want): The margin was drawn around the clinically apparent lesion. Incisions were then made along these lines to the appropriate tissue plane and the lesion was extirpated.
Burow's Advancement Flap Text: The defect edges were debeveled with a #15 scalpel blade.  Given the location of the defect and the proximity to free margins a Burow's advancement flap was deemed most appropriate.  Using a sterile surgical marker, the appropriate advancement flap was drawn incorporating the defect and placing the expected incisions within the relaxed skin tension lines where possible.    The area thus outlined was incised deep to adipose tissue with a #15 scalpel blade.  The skin margins were undermined to an appropriate distance in all directions utilizing iris scissors.
Double O-Z Flap Text: The defect edges were debeveled with a #15 scalpel blade.  Given the location of the defect, shape of the defect and the proximity to free margins a Double O-Z flap was deemed most appropriate.  Using a sterile surgical marker, an appropriate transposition flap was drawn incorporating the defect and placing the expected incisions within the relaxed skin tension lines where possible. The area thus outlined was incised deep to adipose tissue with a #15 scalpel blade.  The skin margins were undermined to an appropriate distance in all directions utilizing iris scissors.
Complex Repair And Dermal Autograft Text: The defect edges were debeveled with a #15 scalpel blade.  The primary defect was closed partially with a complex linear closure.  Given the location of the defect, shape of the defect and the proximity to free margins an dermal autograft was deemed most appropriate to repair the remaining defect.  The graft was trimmed to fit the size of the remaining defect.  The graft was then placed in the primary defect, oriented appropriately, and sutured into place.
Hemigard Postcare Instructions: The HEMIGARD strips are to remain completely dry for at least 5-7 days.
Complex Repair And Single Advancement Flap Text: The defect edges were debeveled with a #15 scalpel blade.  The primary defect was closed partially with a complex linear closure.  Given the location of the remaining defect, shape of the defect and the proximity to free margins a single advancement flap was deemed most appropriate for complete closure of the defect.  Using a sterile surgical marker, an appropriate advancement flap was drawn incorporating the defect and placing the expected incisions within the relaxed skin tension lines where possible.    The area thus outlined was incised deep to adipose tissue with a #15 scalpel blade.  The skin margins were undermined to an appropriate distance in all directions utilizing iris scissors.
Complex Repair And Melolabial Flap Text: The defect edges were debeveled with a #15 scalpel blade.  The primary defect was closed partially with a complex linear closure.  Given the location of the remaining defect, shape of the defect and the proximity to free margins a melolabial flap was deemed most appropriate for complete closure of the defect.  Using a sterile surgical marker, an appropriate advancement flap was drawn incorporating the defect and placing the expected incisions within the relaxed skin tension lines where possible.    The area thus outlined was incised deep to adipose tissue with a #15 scalpel blade.  The skin margins were undermined to an appropriate distance in all directions utilizing iris scissors.
Complex Repair And W Plasty Text: The defect edges were debeveled with a #15 scalpel blade.  The primary defect was closed partially with a complex linear closure.  Given the location of the remaining defect, shape of the defect and the proximity to free margins a W plasty was deemed most appropriate for complete closure of the defect.  Using a sterile surgical marker, an appropriate advancement flap was drawn incorporating the defect and placing the expected incisions within the relaxed skin tension lines where possible.    The area thus outlined was incised deep to adipose tissue with a #15 scalpel blade.  The skin margins were undermined to an appropriate distance in all directions utilizing iris scissors.
Epidermal Autograft Text: The defect edges were debeveled with a #15 scalpel blade.  Given the location of the defect, shape of the defect and the proximity to free margins an epidermal autograft was deemed most appropriate.  Using a sterile surgical marker, the primary defect shape was transferred to the donor site. The epidermal graft was then harvested.  The skin graft was then placed in the primary defect and oriented appropriately.
O-T Plasty Text: The defect edges were debeveled with a #15 scalpel blade.  Given the location of the defect, shape of the defect and the proximity to free margins an O-T plasty was deemed most appropriate.  Using a sterile surgical marker, an appropriate O-T plasty was drawn incorporating the defect and placing the expected incisions within the relaxed skin tension lines where possible.    The area thus outlined was incised deep to adipose tissue with a #15 scalpel blade.  The skin margins were undermined to an appropriate distance in all directions utilizing iris scissors.
Anesthesia Type: 1% lidocaine with epinephrine
Where Do You Want The Question To Include Opioid Counseling Located?: Case Summary Tab
Zygomaticofacial Flap Text: Given the location of the defect, shape of the defect and the proximity to free margins a zygomaticofacial flap was deemed most appropriate for repair.  Using a sterile surgical marker, the appropriate flap was drawn incorporating the defect and placing the expected incisions within the relaxed skin tension lines where possible. The area thus outlined was incised deep to adipose tissue with a #15 scalpel blade with preservation of a vascular pedicle.  The skin margins were undermined to an appropriate distance in all directions utilizing iris scissors.  The flap was then placed into the defect and anchored with interrupted buried subcutaneous sutures.
Undermining Type: Entire Wound
Paramedian Forehead Flap Text: A decision was made to reconstruct the defect utilizing an interpolation axial flap and a staged reconstruction.  A telfa template was made of the defect.  This telfa template was then used to outline the paramedian forehead pedicle flap.  The donor area for the pedicle flap was then injected with anesthesia.  The flap was excised through the skin and subcutaneous tissue down to the layer of the underlying musculature.  The pedicle flap was carefully excised within this deep plane to maintain its blood supply.  The edges of the donor site were undermined.   The donor site was closed in a primary fashion.  The pedicle was then rotated into position and sutured.  Once the tube was sutured into place, adequate blood supply was confirmed with blanching and refill.  The pedicle was then wrapped with xeroform gauze and dressed appropriately with a telfa and gauze bandage to ensure continued blood supply and protect the attached pedicle.
Hemostasis: Pressure and Electrodesiccation
Helical Rim Advancement Flap Text: The defect edges were debeveled with a #15 blade scalpel.  Given the location of the defect and the proximity to free margins (helical rim) a double helical rim advancement flap was deemed most appropriate.  Using a sterile surgical marker, the appropriate advancement flaps were drawn incorporating the defect and placing the expected incisions between the helical rim and antihelix where possible.  The area thus outlined was incised through and through with a #15 scalpel blade.  With a skin hook and iris scissors, the flaps were gently and sharply undermined and freed up.
Dorsal Nasal Flap Text: The defect edges were debeveled with a #15 scalpel blade.  Given the location of the defect and the proximity to free margins a dorsal nasal flap was deemed most appropriate.  Using a sterile surgical marker, an appropriate dorsal nasal flap was drawn around the defect.    The area thus outlined was incised deep to adipose tissue with a #15 scalpel blade.  The skin margins were undermined to an appropriate distance in all directions utilizing iris scissors.
Detail Level: Detailed
Staged Advancement Flap Text: The defect edges were debeveled with a #15 scalpel blade.  Given the location of the defect, shape of the defect and the proximity to free margins a staged advancement flap was deemed most appropriate.  Using a sterile surgical marker, an appropriate advancement flap was drawn incorporating the defect and placing the expected incisions within the relaxed skin tension lines where possible. The area thus outlined was incised deep to adipose tissue with a #15 scalpel blade.  The skin margins were undermined to an appropriate distance in all directions utilizing iris scissors.
Orbicularis Oris Muscle Flap Text: The defect edges were debeveled with a #15 scalpel blade.  Given that the defect affected the competency of the oral sphincter an obicularis oris muscle flap was deemed most appropriate to restore this competency and normal muscle function.  Using a sterile surgical marker, an appropriate flap was drawn incorporating the defect. The area thus outlined was incised with a #15 scalpel blade.
No Repair - Repaired With Adjacent Surgical Defect Text (Leave Blank If You Do Not Want): After the excision the defect was repaired concurrently with another surgical defect which was in close approximation.
A-T Advancement Flap Text: The defect edges were debeveled with a #15 scalpel blade.  Given the location of the defect, shape of the defect and the proximity to free margins an A-T advancement flap was deemed most appropriate.  Using a sterile surgical marker, an appropriate advancement flap was drawn incorporating the defect and placing the expected incisions within the relaxed skin tension lines where possible.    The area thus outlined was incised deep to adipose tissue with a #15 scalpel blade.  The skin margins were undermined to an appropriate distance in all directions utilizing iris scissors.
Mercedes Flap Text: The defect edges were debeveled with a #15 scalpel blade.  Given the location of the defect, shape of the defect and the proximity to free margins a Mercedes flap was deemed most appropriate.  Using a sterile surgical marker, an appropriate advancement flap was drawn incorporating the defect and placing the expected incisions within the relaxed skin tension lines where possible. The area thus outlined was incised deep to adipose tissue with a #15 scalpel blade.  The skin margins were undermined to an appropriate distance in all directions utilizing iris scissors.
Complex Repair And Skin Substitute Graft Text: The defect edges were debeveled with a #15 scalpel blade.  The primary defect was closed partially with a complex linear closure.  Given the location of the remaining defect, shape of the defect and the proximity to free margins a skin substitute graft was deemed most appropriate to repair the remaining defect.  The graft was trimmed to fit the size of the remaining defect.  The graft was then placed in the primary defect, oriented appropriately, and sutured into place.
Pre-Excision Curettage Text (Leave Blank If You Do Not Want): Prior to drawing the surgical margin the visible lesion was removed with electrodesiccation and curettage to clearly define the lesion size.
Purse String (Intermediate) Text: Given the location of the defect and the characteristics of the surrounding skin a purse string intermediate closure was deemed most appropriate.  Undermining was performed circumfirentially around the surgical defect.  A purse string suture was then placed and tightened.
Complex Repair And A-T Advancement Flap Text: The defect edges were debeveled with a #15 scalpel blade.  The primary defect was closed partially with a complex linear closure.  Given the location of the remaining defect, shape of the defect and the proximity to free margins an A-T advancement flap was deemed most appropriate for complete closure of the defect.  Using a sterile surgical marker, an appropriate advancement flap was drawn incorporating the defect and placing the expected incisions within the relaxed skin tension lines where possible.    The area thus outlined was incised deep to adipose tissue with a #15 scalpel blade.  The skin margins were undermined to an appropriate distance in all directions utilizing iris scissors.
Complex Repair And Transposition Flap Text: The defect edges were debeveled with a #15 scalpel blade.  The primary defect was closed partially with a complex linear closure.  Given the location of the remaining defect, shape of the defect and the proximity to free margins a transposition flap was deemed most appropriate for complete closure of the defect.  Using a sterile surgical marker, an appropriate advancement flap was drawn incorporating the defect and placing the expected incisions within the relaxed skin tension lines where possible.    The area thus outlined was incised deep to adipose tissue with a #15 scalpel blade.  The skin margins were undermined to an appropriate distance in all directions utilizing iris scissors.
Scalpel Size: 15 blade
Size Of Margin In Cm: 0.5
Complex Repair And Ftsg Text: The defect edges were debeveled with a #15 scalpel blade.  The primary defect was closed partially with a complex linear closure.  Given the location of the defect, shape of the defect and the proximity to free margins a full thickness skin graft was deemed most appropriate to repair the remaining defect.  The graft was trimmed to fit the size of the remaining defect.  The graft was then placed in the primary defect, oriented appropriately, and sutured into place.
Rhomboid Transposition Flap Text: The defect edges were debeveled with a #15 scalpel blade.  Given the location of the defect and the proximity to free margins a rhomboid transposition flap was deemed most appropriate.  Using a sterile surgical marker, an appropriate rhomboid flap was drawn incorporating the defect.    The area thus outlined was incised deep to adipose tissue with a #15 scalpel blade.  The skin margins were undermined to an appropriate distance in all directions utilizing iris scissors.
Surgeon Performing The Repair (Optional): Jes Trujillo PA-C
Chonodrocutaneous Helical Advancement Flap Text: The defect edges were debeveled with a #15 scalpel blade.  Given the location of the defect and the proximity to free margins a chondrocutaneous helical advancement flap was deemed most appropriate.  Using a sterile surgical marker, the appropriate advancement flap was drawn incorporating the defect and placing the expected incisions within the relaxed skin tension lines where possible.    The area thus outlined was incised deep to adipose tissue with a #15 scalpel blade.  The skin margins were undermined to an appropriate distance in all directions utilizing iris scissors.
V-Y Flap Text: The defect edges were debeveled with a #15 scalpel blade.  Given the location of the defect, shape of the defect and the proximity to free margins a V-Y flap was deemed most appropriate.  Using a sterile surgical marker, an appropriate advancement flap was drawn incorporating the defect and placing the expected incisions within the relaxed skin tension lines where possible.    The area thus outlined was incised deep to adipose tissue with a #15 scalpel blade.  The skin margins were undermined to an appropriate distance in all directions utilizing iris scissors.
Rotation Flap Text: The defect edges were debeveled with a #15 scalpel blade.  Given the location of the defect, shape of the defect and the proximity to free margins a rotation flap was deemed most appropriate.  Using a sterile surgical marker, an appropriate rotation flap was drawn incorporating the defect and placing the expected incisions within the relaxed skin tension lines where possible.    The area thus outlined was incised deep to adipose tissue with a #15 scalpel blade.  The skin margins were undermined to an appropriate distance in all directions utilizing iris scissors.
Nasal Turnover Hinge Flap Text: The defect edges were debeveled with a #15 scalpel blade.  Given the size, depth, location of the defect and the defect being full thickness a nasal turnover hinge flap was deemed most appropriate.  Using a sterile surgical marker, an appropriate hinge flap was drawn incorporating the defect. The area thus outlined was incised with a #15 scalpel blade. The flap was designed to recreate the nasal mucosal lining and the alar rim. The skin margins were undermined to an appropriate distance in all directions utilizing iris scissors.
Complex Repair And Z Plasty Text: The defect edges were debeveled with a #15 scalpel blade.  The primary defect was closed partially with a complex linear closure.  Given the location of the remaining defect, shape of the defect and the proximity to free margins a Z plasty was deemed most appropriate for complete closure of the defect.  Using a sterile surgical marker, an appropriate advancement flap was drawn incorporating the defect and placing the expected incisions within the relaxed skin tension lines where possible.    The area thus outlined was incised deep to adipose tissue with a #15 scalpel blade.  The skin margins were undermined to an appropriate distance in all directions utilizing iris scissors.
Complex Repair And Tissue Cultured Epidermal Autograft Text: The defect edges were debeveled with a #15 scalpel blade.  The primary defect was closed partially with a complex linear closure.  Given the location of the defect, shape of the defect and the proximity to free margins an tissue cultured epidermal autograft was deemed most appropriate to repair the remaining defect.  The graft was trimmed to fit the size of the remaining defect.  The graft was then placed in the primary defect, oriented appropriately, and sutured into place.
Accession #: [id number]
Complex Repair And Rotation Flap Text: The defect edges were debeveled with a #15 scalpel blade.  The primary defect was closed partially with a complex linear closure.  Given the location of the remaining defect, shape of the defect and the proximity to free margins a rotation flap was deemed most appropriate for complete closure of the defect.  Using a sterile surgical marker, an appropriate advancement flap was drawn incorporating the defect and placing the expected incisions within the relaxed skin tension lines where possible.    The area thus outlined was incised deep to adipose tissue with a #15 scalpel blade.  The skin margins were undermined to an appropriate distance in all directions utilizing iris scissors.
Complex Repair And Double Advancement Flap Text: The defect edges were debeveled with a #15 scalpel blade.  The primary defect was closed partially with a complex linear closure.  Given the location of the remaining defect, shape of the defect and the proximity to free margins a double advancement flap was deemed most appropriate for complete closure of the defect.  Using a sterile surgical marker, an appropriate advancement flap was drawn incorporating the defect and placing the expected incisions within the relaxed skin tension lines where possible.    The area thus outlined was incised deep to adipose tissue with a #15 scalpel blade.  The skin margins were undermined to an appropriate distance in all directions utilizing iris scissors.
Lip Wedge Excision Repair Text: Given the location of the defect and the proximity to free margins a full thickness wedge repair was deemed most appropriate.  Using a sterile surgical marker, the appropriate repair was drawn incorporating the defect and placing the expected incisions perpendicular to the vermilion border.  The vermilion border was also meticulously outlined to ensure appropriate reapproximation during the repair.  The area thus outlined was incised through and through with a #15 scalpel blade.  The muscularis and dermis were reaproximated with deep sutures following hemostasis. Care was taken to realign the vermilion border before proceeding with the superficial closure.  Once the vermilion was realigned the superfical and mucosal closure was finished.
Dermal Autograft Text: The defect edges were debeveled with a #15 scalpel blade.  Given the location of the defect, shape of the defect and the proximity to free margins a dermal autograft was deemed most appropriate.  Using a sterile surgical marker, the primary defect shape was transferred to the donor site. The area thus outlined was incised deep to adipose tissue with a #15 scalpel blade.  The harvested graft was then trimmed of adipose and epidermal tissue until only dermis was left.  The skin graft was then placed in the primary defect and oriented appropriately.
Island Pedicle Flap Text: The defect edges were debeveled with a #15 scalpel blade.  Given the location of the defect, shape of the defect and the proximity to free margins an island pedicle advancement flap was deemed most appropriate.  Using a sterile surgical marker, an appropriate advancement flap was drawn incorporating the defect, outlining the appropriate donor tissue and placing the expected incisions within the relaxed skin tension lines where possible.    The area thus outlined was incised deep to adipose tissue with a #15 scalpel blade.  The skin margins were undermined to an appropriate distance in all directions around the primary defect and laterally outward around the island pedicle utilizing iris scissors.  There was minimal undermining beneath the pedicle flap.
O-Z Plasty Text: The defect edges were debeveled with a #15 scalpel blade.  Given the location of the defect, shape of the defect and the proximity to free margins an O-Z plasty (double transposition flap) was deemed most appropriate.  Using a sterile surgical marker, the appropriate transposition flaps were drawn incorporating the defect and placing the expected incisions within the relaxed skin tension lines where possible.    The area thus outlined was incised deep to adipose tissue with a #15 scalpel blade.  The skin margins were undermined to an appropriate distance in all directions utilizing iris scissors.  Hemostasis was achieved with electrocautery.  The flaps were then transposed into place, one clockwise and the other counterclockwise, and anchored with interrupted buried subcutaneous sutures.
Cheek Interpolation Flap Text: A decision was made to reconstruct the defect utilizing an interpolation axial flap and a staged reconstruction.  A telfa template was made of the defect.  This telfa template was then used to outline the Cheek Interpolation flap.  The donor area for the pedicle flap was then injected with anesthesia.  The flap was excised through the skin and subcutaneous tissue down to the layer of the underlying musculature.  The interpolation flap was carefully excised within this deep plane to maintain its blood supply.  The edges of the donor site were undermined.   The donor site was closed in a primary fashion.  The pedicle was then rotated into position and sutured.  Once the tube was sutured into place, adequate blood supply was confirmed with blanching and refill.  The pedicle was then wrapped with xeroform gauze and dressed appropriately with a telfa and gauze bandage to ensure continued blood supply and protect the attached pedicle.
Excision Depth: adipose tissue
Bilateral Helical Rim Advancement Flap Text: The defect edges were debeveled with a #15 blade scalpel.  Given the location of the defect and the proximity to free margins (helical rim) a bilateral helical rim advancement flap was deemed most appropriate.  Using a sterile surgical marker, the appropriate advancement flaps were drawn incorporating the defect and placing the expected incisions between the helical rim and antihelix where possible.  The area thus outlined was incised through and through with a #15 scalpel blade.  With a skin hook and iris scissors, the flaps were gently and sharply undermined and freed up.
Modified Advancement Flap Text: The defect edges were debeveled with a #15 scalpel blade.  Given the location of the defect, shape of the defect and the proximity to free margins a modified advancement flap was deemed most appropriate.  Using a sterile surgical marker, an appropriate advancement flap was drawn incorporating the defect and placing the expected incisions within the relaxed skin tension lines where possible.    The area thus outlined was incised deep to adipose tissue with a #15 scalpel blade.  The skin margins were undermined to an appropriate distance in all directions utilizing iris scissors.
Star Wedge Flap Text: The defect edges were debeveled with a #15 scalpel blade.  Given the location of the defect, shape of the defect and the proximity to free margins a star wedge flap was deemed most appropriate.  Using a sterile surgical marker, an appropriate rotation flap was drawn incorporating the defect and placing the expected incisions within the relaxed skin tension lines where possible. The area thus outlined was incised deep to adipose tissue with a #15 scalpel blade.  The skin margins were undermined to an appropriate distance in all directions utilizing iris scissors.
Adjacent Tissue Transfer Text: The defect edges were debeveled with a #15 scalpel blade.  Given the location of the defect and the proximity to free margins an adjacent tissue transfer was deemed most appropriate.  Using a sterile surgical marker, an appropriate flap was drawn incorporating the defect and placing the expected incisions within the relaxed skin tension lines where possible.    The area thus outlined was incised deep to adipose tissue with a #15 scalpel blade.  The skin margins were undermined to an appropriate distance in all directions utilizing iris scissors.
O-T Advancement Flap Text: The defect edges were debeveled with a #15 scalpel blade.  Given the location of the defect, shape of the defect and the proximity to free margins an O-T advancement flap was deemed most appropriate.  Using a sterile surgical marker, an appropriate advancement flap was drawn incorporating the defect and placing the expected incisions within the relaxed skin tension lines where possible.    The area thus outlined was incised deep to adipose tissue with a #15 scalpel blade.  The skin margins were undermined to an appropriate distance in all directions utilizing iris scissors.
Anesthesia Volume In Cc: 7
Complex Repair And Burow's Graft Text: The defect edges were debeveled with a #15 scalpel blade.  The primary defect was closed partially with a complex linear closure.  Given the location of the defect, shape of the defect, the proximity to free margins and the presence of a standing cone deformity a Burow's graft was deemed most appropriate to repair the remaining defect.  The graft was trimmed to fit the size of the remaining defect.  The graft was then placed in the primary defect, oriented appropriately, and sutured into place.
Wound Care: Polysporin ointment
Suturegard Intro: Intraoperative tissue expansion was performed, utilizing the SUTUREGARD device, in order to reduce wound tension.
Purse String (Simple) Text: Given the location of the defect and the characteristics of the surrounding skin a purse string simple closure was deemed most appropriate.  Undermining was performed circumferentially around the surgical defect.  A purse string suture was then placed and tightened.
Complex Repair And O-T Advancement Flap Text: The defect edges were debeveled with a #15 scalpel blade.  The primary defect was closed partially with a complex linear closure.  Given the location of the remaining defect, shape of the defect and the proximity to free margins an O-T advancement flap was deemed most appropriate for complete closure of the defect.  Using a sterile surgical marker, an appropriate advancement flap was drawn incorporating the defect and placing the expected incisions within the relaxed skin tension lines where possible.    The area thus outlined was incised deep to adipose tissue with a #15 scalpel blade.  The skin margins were undermined to an appropriate distance in all directions utilizing iris scissors.
Deep Sutures: 3-0 Vicryl
Complex Repair And V-Y Plasty Text: The defect edges were debeveled with a #15 scalpel blade.  The primary defect was closed partially with a complex linear closure.  Given the location of the remaining defect, shape of the defect and the proximity to free margins a V-Y plasty was deemed most appropriate for complete closure of the defect.  Using a sterile surgical marker, an appropriate advancement flap was drawn incorporating the defect and placing the expected incisions within the relaxed skin tension lines where possible.    The area thus outlined was incised deep to adipose tissue with a #15 scalpel blade.  The skin margins were undermined to an appropriate distance in all directions utilizing iris scissors.
Eye Clamp Note Details: An eye clamp was used during the procedure.

## 2023-12-14 ENCOUNTER — APPOINTMENT (RX ONLY)
Dept: URBAN - METROPOLITAN AREA CLINIC 114 | Facility: CLINIC | Age: 68
Setting detail: DERMATOLOGY
End: 2023-12-14

## 2023-12-14 VITALS — HEIGHT: 70 IN | WEIGHT: 205 LBS

## 2023-12-14 DIAGNOSIS — L91.8 OTHER HYPERTROPHIC DISORDERS OF THE SKIN: ICD-10-CM | Status: INADEQUATELY CONTROLLED

## 2023-12-14 DIAGNOSIS — Z71.89 OTHER SPECIFIED COUNSELING: ICD-10-CM

## 2023-12-14 DIAGNOSIS — L82.0 INFLAMED SEBORRHEIC KERATOSIS: ICD-10-CM | Status: INADEQUATELY CONTROLLED

## 2023-12-14 DIAGNOSIS — D18.0 HEMANGIOMA: ICD-10-CM | Status: STABLE

## 2023-12-14 DIAGNOSIS — L82.1 OTHER SEBORRHEIC KERATOSIS: ICD-10-CM

## 2023-12-14 DIAGNOSIS — D22 MELANOCYTIC NEVI: ICD-10-CM | Status: STABLE

## 2023-12-14 DIAGNOSIS — L57.0 ACTINIC KERATOSIS: ICD-10-CM | Status: INADEQUATELY CONTROLLED

## 2023-12-14 DIAGNOSIS — L85.3 XEROSIS CUTIS: ICD-10-CM | Status: STABLE

## 2023-12-14 DIAGNOSIS — Z85.828 PERSONAL HISTORY OF OTHER MALIGNANT NEOPLASM OF SKIN: ICD-10-CM

## 2023-12-14 DIAGNOSIS — L57.8 OTHER SKIN CHANGES DUE TO CHRONIC EXPOSURE TO NONIONIZING RADIATION: ICD-10-CM | Status: STABLE

## 2023-12-14 PROBLEM — D18.01 HEMANGIOMA OF SKIN AND SUBCUTANEOUS TISSUE: Status: ACTIVE | Noted: 2023-12-14

## 2023-12-14 PROBLEM — D22.5 MELANOCYTIC NEVI OF TRUNK: Status: ACTIVE | Noted: 2023-12-14

## 2023-12-14 PROCEDURE — ? LIQUID NITROGEN

## 2023-12-14 PROCEDURE — ? PRESCRIPTION

## 2023-12-14 PROCEDURE — 17110 DESTRUCTION B9 LES UP TO 14: CPT

## 2023-12-14 PROCEDURE — ? PRESCRIPTION MEDICATION MANAGEMENT

## 2023-12-14 PROCEDURE — 17003 DESTRUCT PREMALG LES 2-14: CPT | Mod: 59

## 2023-12-14 PROCEDURE — 99214 OFFICE O/P EST MOD 30 MIN: CPT | Mod: 25

## 2023-12-14 PROCEDURE — ? ADDITIONAL NOTES

## 2023-12-14 PROCEDURE — ? COUNSELING

## 2023-12-14 PROCEDURE — 17000 DESTRUCT PREMALG LESION: CPT | Mod: 59

## 2023-12-14 PROCEDURE — ? SUNSCREEN RECOMMENDATIONS

## 2023-12-14 RX ORDER — TRIAMCINOLONE ACETONIDE 1 MG/G
CREAM TOPICAL
Qty: 80 | Refills: 1 | Status: ERX | COMMUNITY
Start: 2023-12-14

## 2023-12-14 RX ADMIN — TRIAMCINOLONE ACETONIDE: 1 CREAM TOPICAL at 00:00

## 2023-12-14 ASSESSMENT — LOCATION DETAILED DESCRIPTION DERM
LOCATION DETAILED: LEFT CENTRAL MALAR CHEEK
LOCATION DETAILED: SUPERIOR THORACIC SPINE
LOCATION DETAILED: INFERIOR THORACIC SPINE
LOCATION DETAILED: RIGHT CENTRAL TEMPLE
LOCATION DETAILED: LEFT LATERAL SUPERIOR EYELID
LOCATION DETAILED: RIGHT MEDIAL TRAPEZIAL NECK
LOCATION DETAILED: RIGHT MEDIAL SUPERIOR CHEST
LOCATION DETAILED: LEFT LATERAL SUPERIOR CHEST
LOCATION DETAILED: EPIGASTRIC SKIN
LOCATION DETAILED: RIGHT INFERIOR FOREHEAD
LOCATION DETAILED: RIGHT CENTRAL MALAR CHEEK
LOCATION DETAILED: MIDDLE STERNUM

## 2023-12-14 ASSESSMENT — LOCATION ZONE DERM
LOCATION ZONE: EYELID
LOCATION ZONE: TRUNK
LOCATION ZONE: FACE
LOCATION ZONE: NECK

## 2023-12-14 ASSESSMENT — LOCATION SIMPLE DESCRIPTION DERM
LOCATION SIMPLE: RIGHT FOREHEAD
LOCATION SIMPLE: ABDOMEN
LOCATION SIMPLE: LEFT CHEEK
LOCATION SIMPLE: LEFT SUPERIOR EYELID
LOCATION SIMPLE: POSTERIOR NECK
LOCATION SIMPLE: CHEST
LOCATION SIMPLE: UPPER BACK
LOCATION SIMPLE: RIGHT CHEEK
LOCATION SIMPLE: RIGHT TEMPLE

## 2023-12-14 ASSESSMENT — PAIN INTENSITY VAS: HOW INTENSE IS YOUR PAIN 0 BEING NO PAIN, 10 BEING THE MOST SEVERE PAIN POSSIBLE?: NO PAIN

## 2023-12-14 NOTE — PROCEDURE: ADDITIONAL NOTES
Render Risk Assessment In Note?: no
Detail Level: Detailed
Additional Notes: Courtesy clipping no charge.

## 2023-12-14 NOTE — PROCEDURE: COUNSELING
Detail Level: Detailed
Sunscreen Recommendations: Every 6 months.
Detail Level: Zone
Detail Level: Generalized
Detail Level: Simple

## 2023-12-14 NOTE — PROCEDURE: PRESCRIPTION MEDICATION MANAGEMENT
Render In Strict Bullet Format?: No
Plan: Recommended daily moisturization with creams rather than lotions, especially directly after showering.
Initiate Treatment: triamcinolone acetonide 0.1 % topical cream \\nSig: Apply to affected areas 1-2 times a day for 2 weeks, take one week off, then repeat as needed for flares.
Detail Level: Zone

## 2023-12-14 NOTE — PROCEDURE: LIQUID NITROGEN
Spray Paint Technique: No
Show Topical Anesthesia Variable?: Yes
Detail Level: Detailed
Post-Care Instructions: I reviewed with the patient in detail post-care instructions. Patient is to wear sunprotection, and avoid picking at any of the treated lesions. Pt may apply Vaseline to crusted or scabbing areas.
Duration Of Freeze Thaw-Cycle (Seconds): 2
Spray Paint Text: The liquid nitrogen was applied to the skin utilizing a spray paint frosting technique.
Medical Necessity Information: It is in your best interest to select a reason for this procedure from the list below. All of these items fulfill various CMS LCD requirements except the new and changing color options.
Consent: The patient's consent was obtained including but not limited to risks of crusting, scabbing, blistering, scarring, darker or lighter pigmentary change, recurrence, incomplete removal and infection.
Application Tool (Optional): Liquid Nitrogen Sprayer
Number Of Freeze-Thaw Cycles: 3 freeze-thaw cycles
Medical Necessity Clause: This procedure was medically necessary because the lesions that were treated were:

## 2023-12-19 ENCOUNTER — RX ONLY (OUTPATIENT)
Age: 68
Setting detail: RX ONLY
End: 2023-12-19

## 2023-12-19 RX ORDER — TRIAMCINOLONE ACETONIDE 1 MG/G
CREAM TOPICAL
Qty: 80 | Refills: 1 | Status: ERX